# Patient Record
Sex: FEMALE | Race: WHITE | NOT HISPANIC OR LATINO | Employment: OTHER | URBAN - METROPOLITAN AREA
[De-identification: names, ages, dates, MRNs, and addresses within clinical notes are randomized per-mention and may not be internally consistent; named-entity substitution may affect disease eponyms.]

---

## 2022-01-03 ENCOUNTER — APPOINTMENT (OUTPATIENT)
Dept: RADIOLOGY | Facility: MEDICAL CENTER | Age: 57
End: 2022-01-03
Attending: NURSE PRACTITIONER
Payer: COMMERCIAL

## 2022-01-04 ENCOUNTER — TELEPHONE (OUTPATIENT)
Dept: SCHEDULING | Facility: IMAGING CENTER | Age: 57
End: 2022-01-04

## 2022-04-07 SDOH — ECONOMIC STABILITY: HOUSING INSECURITY
IN THE LAST 12 MONTHS, WAS THERE A TIME WHEN YOU DID NOT HAVE A STEADY PLACE TO SLEEP OR SLEPT IN A SHELTER (INCLUDING NOW)?: NO

## 2022-04-07 SDOH — HEALTH STABILITY: PHYSICAL HEALTH: ON AVERAGE, HOW MANY DAYS PER WEEK DO YOU ENGAGE IN MODERATE TO STRENUOUS EXERCISE (LIKE A BRISK WALK)?: 7 DAYS

## 2022-04-07 SDOH — ECONOMIC STABILITY: TRANSPORTATION INSECURITY
IN THE PAST 12 MONTHS, HAS THE LACK OF TRANSPORTATION KEPT YOU FROM MEDICAL APPOINTMENTS OR FROM GETTING MEDICATIONS?: NO

## 2022-04-07 SDOH — ECONOMIC STABILITY: INCOME INSECURITY: HOW HARD IS IT FOR YOU TO PAY FOR THE VERY BASICS LIKE FOOD, HOUSING, MEDICAL CARE, AND HEATING?: NOT HARD AT ALL

## 2022-04-07 SDOH — ECONOMIC STABILITY: FOOD INSECURITY: WITHIN THE PAST 12 MONTHS, YOU WORRIED THAT YOUR FOOD WOULD RUN OUT BEFORE YOU GOT MONEY TO BUY MORE.: NEVER TRUE

## 2022-04-07 SDOH — ECONOMIC STABILITY: FOOD INSECURITY: WITHIN THE PAST 12 MONTHS, THE FOOD YOU BOUGHT JUST DIDN'T LAST AND YOU DIDN'T HAVE MONEY TO GET MORE.: NEVER TRUE

## 2022-04-07 SDOH — ECONOMIC STABILITY: HOUSING INSECURITY: IN THE LAST 12 MONTHS, HOW MANY PLACES HAVE YOU LIVED?: 1

## 2022-04-07 SDOH — HEALTH STABILITY: PHYSICAL HEALTH: ON AVERAGE, HOW MANY MINUTES DO YOU ENGAGE IN EXERCISE AT THIS LEVEL?: 40 MIN

## 2022-04-07 SDOH — HEALTH STABILITY: MENTAL HEALTH
STRESS IS WHEN SOMEONE FEELS TENSE, NERVOUS, ANXIOUS, OR CAN'T SLEEP AT NIGHT BECAUSE THEIR MIND IS TROUBLED. HOW STRESSED ARE YOU?: ONLY A LITTLE

## 2022-04-07 SDOH — ECONOMIC STABILITY: TRANSPORTATION INSECURITY
IN THE PAST 12 MONTHS, HAS LACK OF RELIABLE TRANSPORTATION KEPT YOU FROM MEDICAL APPOINTMENTS, MEETINGS, WORK OR FROM GETTING THINGS NEEDED FOR DAILY LIVING?: NO

## 2022-04-07 SDOH — ECONOMIC STABILITY: INCOME INSECURITY: IN THE LAST 12 MONTHS, WAS THERE A TIME WHEN YOU WERE NOT ABLE TO PAY THE MORTGAGE OR RENT ON TIME?: NO

## 2022-04-07 SDOH — ECONOMIC STABILITY: TRANSPORTATION INSECURITY
IN THE PAST 12 MONTHS, HAS LACK OF TRANSPORTATION KEPT YOU FROM MEETINGS, WORK, OR FROM GETTING THINGS NEEDED FOR DAILY LIVING?: NO

## 2022-04-07 ASSESSMENT — SOCIAL DETERMINANTS OF HEALTH (SDOH)
DO YOU BELONG TO ANY CLUBS OR ORGANIZATIONS SUCH AS CHURCH GROUPS UNIONS, FRATERNAL OR ATHLETIC GROUPS, OR SCHOOL GROUPS?: YES
HOW OFTEN DO YOU ATTENT MEETINGS OF THE CLUB OR ORGANIZATION YOU BELONG TO?: MORE THAN 4 TIMES PER YEAR
HOW OFTEN DO YOU HAVE A DRINK CONTAINING ALCOHOL: 2-3 TIMES A WEEK
IN A TYPICAL WEEK, HOW MANY TIMES DO YOU TALK ON THE PHONE WITH FAMILY, FRIENDS, OR NEIGHBORS?: MORE THAN THREE TIMES A WEEK
HOW OFTEN DO YOU ATTEND CHURCH OR RELIGIOUS SERVICES?: MORE THAN 4 TIMES PER YEAR
DO YOU BELONG TO ANY CLUBS OR ORGANIZATIONS SUCH AS CHURCH GROUPS UNIONS, FRATERNAL OR ATHLETIC GROUPS, OR SCHOOL GROUPS?: YES
WITHIN THE PAST 12 MONTHS, YOU WORRIED THAT YOUR FOOD WOULD RUN OUT BEFORE YOU GOT THE MONEY TO BUY MORE: NEVER TRUE
HOW OFTEN DO YOU HAVE SIX OR MORE DRINKS ON ONE OCCASION: NEVER
HOW MANY DRINKS CONTAINING ALCOHOL DO YOU HAVE ON A TYPICAL DAY WHEN YOU ARE DRINKING: 1 OR 2
IN A TYPICAL WEEK, HOW MANY TIMES DO YOU TALK ON THE PHONE WITH FAMILY, FRIENDS, OR NEIGHBORS?: MORE THAN THREE TIMES A WEEK
HOW OFTEN DO YOU GET TOGETHER WITH FRIENDS OR RELATIVES?: MORE THAN THREE TIMES A WEEK
HOW OFTEN DO YOU ATTENT MEETINGS OF THE CLUB OR ORGANIZATION YOU BELONG TO?: MORE THAN 4 TIMES PER YEAR
HOW OFTEN DO YOU ATTEND CHURCH OR RELIGIOUS SERVICES?: MORE THAN 4 TIMES PER YEAR
HOW OFTEN DO YOU GET TOGETHER WITH FRIENDS OR RELATIVES?: MORE THAN THREE TIMES A WEEK
HOW HARD IS IT FOR YOU TO PAY FOR THE VERY BASICS LIKE FOOD, HOUSING, MEDICAL CARE, AND HEATING?: NOT HARD AT ALL

## 2022-04-07 ASSESSMENT — LIFESTYLE VARIABLES
HOW MANY STANDARD DRINKS CONTAINING ALCOHOL DO YOU HAVE ON A TYPICAL DAY: 1 OR 2
HOW OFTEN DO YOU HAVE A DRINK CONTAINING ALCOHOL: 2-3 TIMES A WEEK
HOW OFTEN DO YOU HAVE SIX OR MORE DRINKS ON ONE OCCASION: NEVER

## 2022-04-08 ENCOUNTER — OFFICE VISIT (OUTPATIENT)
Dept: MEDICAL GROUP | Facility: PHYSICIAN GROUP | Age: 57
End: 2022-04-08
Payer: COMMERCIAL

## 2022-04-08 VITALS
HEART RATE: 76 BPM | OXYGEN SATURATION: 93 % | BODY MASS INDEX: 28 KG/M2 | SYSTOLIC BLOOD PRESSURE: 102 MMHG | TEMPERATURE: 98.8 F | DIASTOLIC BLOOD PRESSURE: 72 MMHG | HEIGHT: 64 IN | WEIGHT: 164 LBS

## 2022-04-08 DIAGNOSIS — Z12.31 ENCOUNTER FOR SCREENING MAMMOGRAM FOR MALIGNANT NEOPLASM OF BREAST: ICD-10-CM

## 2022-04-08 DIAGNOSIS — Z82.49 FAMILY HISTORY OF HEART ATTACK: ICD-10-CM

## 2022-04-08 DIAGNOSIS — Z71.84 TRAVEL ADVICE ENCOUNTER: ICD-10-CM

## 2022-04-08 DIAGNOSIS — Z11.59 NEED FOR HEPATITIS C SCREENING TEST: ICD-10-CM

## 2022-04-08 DIAGNOSIS — Z00.00 WELLNESS EXAMINATION: ICD-10-CM

## 2022-04-08 DIAGNOSIS — Z86.19 HISTORY OF SHINGLES: ICD-10-CM

## 2022-04-08 DIAGNOSIS — Z87.19 HISTORY OF GASTROESOPHAGEAL REFLUX (GERD): ICD-10-CM

## 2022-04-08 PROCEDURE — 99204 OFFICE O/P NEW MOD 45 MIN: CPT

## 2022-04-08 RX ORDER — M-VIT,TX,IRON,MINS/CALC/FOLIC 27MG-0.4MG
1 TABLET ORAL DAILY
COMMUNITY
End: 2023-01-12

## 2022-04-08 ASSESSMENT — PATIENT HEALTH QUESTIONNAIRE - PHQ9: CLINICAL INTERPRETATION OF PHQ2 SCORE: 0

## 2022-04-08 NOTE — ASSESSMENT & PLAN NOTE
-Self-limited condition resolved  -Strongly recommend shingles vaccine (Shingrix) ASAP  -Notify me with any outbreak of shingles, see optometry immediately for involvement your eyes

## 2022-04-08 NOTE — ASSESSMENT & PLAN NOTE
-Chronic stable condition  -Continue to take Pepcid OTC as needed for symptoms  -Notify me if condition worsens  -Avoid trigger foods if possible  -Avoid eating at least 3 hours before bedtime or laying in a recumbent position  -Sleep at a 30 to 45 degree angle if necessary to prevent GERD symptoms while resting

## 2022-04-08 NOTE — PROGRESS NOTES
"Subjective:     CC:  Diagnoses of Wellness examination, Encounter for screening mammogram for malignant neoplasm of breast, Need for hepatitis C screening test, History of gastroesophageal reflux (GERD), History of shingles, and Family history of heart attack were pertinent to this visit.    HISTORY OF THE PRESENT ILLNESS: Patient is a 56 y.o. female. This pleasant patient is here today to establish care and discuss the following problems:    Problem   History of Gastroesophageal Reflux (Gerd)    -Chronic stable condition  -Has been worked up in the past with GI with upper endoscopy and barium swallow.  It was concluded this condition was muscular skeletal in nature, provoked by trigger foods  -Patient was placed on Pepcid OTC as needed, this medication works well for her and she only needs to take it if she eats certain foods such as [place].     History of Shingles    -Patient reports to have previously experienced a shingles outbreak across her jaw.  She denies involvement of her eyes or visual changes during outbreak.  She also experienced a similar outbreak last year after receiving the Covid and Tdap vaccines.  Patient has not been vaccinated for shingles.     Family History of Heart Attack    -Strong family history of hypertension, hyperlipidemia, and heart attack in first-degree relatives.  Patient reports that her brother just had a recent heart attack.  He was placed on hypertension and hyperlipidemia medication.  He is doing well.  Patient reports that she eats a healthy diet and walks 4 miles each day 7 days/week.         Health Maintenance: Completed    ROS:   Review of Systems   All other systems reviewed and are negative.        Objective:     Exam: /72 (BP Location: Left arm, Patient Position: Sitting, BP Cuff Size: Adult)   Pulse 76   Temp 37.1 °C (98.8 °F) (Temporal)   Ht 1.626 m (5' 4\")   Wt 74.4 kg (164 lb)   SpO2 93%  Body mass index is 28.15 kg/m².    Physical Exam  Constitutional:  "      Appearance: Normal appearance.   HENT:      Head: Normocephalic.      Nose: Nose normal.      Mouth/Throat:      Mouth: Mucous membranes are moist.      Pharynx: Oropharynx is clear.   Eyes:      Extraocular Movements: Extraocular movements intact.      Pupils: Pupils are equal, round, and reactive to light.   Cardiovascular:      Rate and Rhythm: Normal rate and regular rhythm.      Pulses: Normal pulses.      Heart sounds: Normal heart sounds.   Pulmonary:      Effort: Pulmonary effort is normal.      Breath sounds: Normal breath sounds.   Musculoskeletal:         General: Normal range of motion.      Cervical back: Normal range of motion and neck supple.   Skin:     General: Skin is warm and dry.      Capillary Refill: Capillary refill takes less than 2 seconds.   Neurological:      General: No focal deficit present.      Mental Status: She is alert and oriented to person, place, and time.   Psychiatric:         Mood and Affect: Mood normal.         Behavior: Behavior normal.     Labs: No current labs to review    Assessment & Plan: Medical Decision Making   56 y.o. female with the following -    Problem List Items Addressed This Visit     History of gastroesophageal reflux (GERD)     -Chronic stable condition  -Continue to take Pepcid OTC as needed for symptoms  -Notify me if condition worsens  -Avoid trigger foods if possible  -Avoid eating at least 3 hours before bedtime or laying in a recumbent position  -Sleep at a 30 to 45 degree angle if necessary to prevent GERD symptoms while resting         History of shingles     -Self-limited condition resolved  -Strongly recommend shingles vaccine (Shingrix) ASAP  -Notify me with any outbreak of shingles, see optometry immediately for involvement your eyes         Family history of heart attack     -We will order routine lab work  -Continue to get exercise at least 30 minutes most days of the week  -Encouraged a Mediterranean style diet           Relevant Orders     CBC WITHOUT DIFFERENTIAL    Comp Metabolic Panel    Lipid Profile    TSH WITH REFLEX TO FT4    VITAMIN D,25 HYDROXY    HEMOGLOBIN A1C      Other Visit Diagnoses     Wellness examination        Relevant Orders    CBC WITHOUT DIFFERENTIAL    Comp Metabolic Panel    Lipid Profile    TSH WITH REFLEX TO FT4    VITAMIN D,25 HYDROXY    HEMOGLOBIN A1C    Encounter for screening mammogram for malignant neoplasm of breast        Relevant Orders    MA-SCREENING MAMMO BILAT W/TOMOSYNTHESIS W/CAD    Need for hepatitis C screening test        Relevant Orders    HCV Scrn ( [id number] 1xLife)          Differential diagnosis, natural history, supportive care, and indications for immediate follow-up discussed.  Shared decision making utilized.  Patient congruent with plan of care.  Patient understands to return to clinic or go to the emergency department if symptoms worsen. All questions and concerns addressed.      Return in about 6 months (around 10/8/2022).    Please note that this dictation was created using voice recognition software. I have made every reasonable attempt to correct obvious errors, but I expect that there are errors of grammar and possibly content that I did not discover before finalizing the note.

## 2022-04-08 NOTE — ASSESSMENT & PLAN NOTE
-We will order routine lab work  -Continue to get exercise at least 30 minutes most days of the week  -Encouraged a Mediterranean style diet

## 2022-04-09 NOTE — PROGRESS NOTES
Patient requesting PCR SARS COVID-19 test for travel.  She will need test by 6-22-22.  Order placed

## 2022-04-13 ENCOUNTER — HOSPITAL ENCOUNTER (OUTPATIENT)
Dept: RADIOLOGY | Facility: MEDICAL CENTER | Age: 57
End: 2022-04-13
Payer: COMMERCIAL

## 2022-06-17 ENCOUNTER — HOSPITAL ENCOUNTER (OUTPATIENT)
Dept: RADIOLOGY | Facility: MEDICAL CENTER | Age: 57
End: 2022-06-17
Payer: COMMERCIAL

## 2022-06-17 DIAGNOSIS — Z12.31 ENCOUNTER FOR SCREENING MAMMOGRAM FOR MALIGNANT NEOPLASM OF BREAST: ICD-10-CM

## 2022-06-17 PROCEDURE — 77063 BREAST TOMOSYNTHESIS BI: CPT

## 2022-06-22 ENCOUNTER — HOSPITAL ENCOUNTER (OUTPATIENT)
Dept: LAB | Facility: MEDICAL CENTER | Age: 57
End: 2022-06-22
Payer: COMMERCIAL

## 2022-06-22 DIAGNOSIS — Z00.00 WELLNESS EXAMINATION: ICD-10-CM

## 2022-06-22 DIAGNOSIS — Z82.49 FAMILY HISTORY OF HEART ATTACK: ICD-10-CM

## 2022-06-22 DIAGNOSIS — Z11.59 NEED FOR HEPATITIS C SCREENING TEST: ICD-10-CM

## 2022-06-22 LAB
25(OH)D3 SERPL-MCNC: 44 NG/ML (ref 30–100)
ALBUMIN SERPL BCP-MCNC: 4.6 G/DL (ref 3.2–4.9)
ALBUMIN/GLOB SERPL: 1.8 G/DL
ALP SERPL-CCNC: 89 U/L (ref 30–99)
ALT SERPL-CCNC: 14 U/L (ref 2–50)
ANION GAP SERPL CALC-SCNC: 12 MMOL/L (ref 7–16)
AST SERPL-CCNC: 17 U/L (ref 12–45)
BILIRUB SERPL-MCNC: 0.8 MG/DL (ref 0.1–1.5)
BUN SERPL-MCNC: 15 MG/DL (ref 8–22)
CALCIUM SERPL-MCNC: 9.5 MG/DL (ref 8.5–10.5)
CHLORIDE SERPL-SCNC: 105 MMOL/L (ref 96–112)
CHOLEST SERPL-MCNC: 255 MG/DL (ref 100–199)
CO2 SERPL-SCNC: 23 MMOL/L (ref 20–33)
CREAT SERPL-MCNC: 0.75 MG/DL (ref 0.5–1.4)
ERYTHROCYTE [DISTWIDTH] IN BLOOD BY AUTOMATED COUNT: 47.6 FL (ref 35.9–50)
EST. AVERAGE GLUCOSE BLD GHB EST-MCNC: 97 MG/DL
FASTING STATUS PATIENT QL REPORTED: NORMAL
GFR SERPLBLD CREATININE-BSD FMLA CKD-EPI: 93 ML/MIN/1.73 M 2
GLOBULIN SER CALC-MCNC: 2.6 G/DL (ref 1.9–3.5)
GLUCOSE SERPL-MCNC: 88 MG/DL (ref 65–99)
HBA1C MFR BLD: 5 % (ref 4–5.6)
HCT VFR BLD AUTO: 43.8 % (ref 37–47)
HCV AB SER QL: NORMAL
HDLC SERPL-MCNC: 67 MG/DL
HGB BLD-MCNC: 14.1 G/DL (ref 12–16)
LDLC SERPL CALC-MCNC: 160 MG/DL
MCH RBC QN AUTO: 30.1 PG (ref 27–33)
MCHC RBC AUTO-ENTMCNC: 32.2 G/DL (ref 33.6–35)
MCV RBC AUTO: 93.6 FL (ref 81.4–97.8)
PLATELET # BLD AUTO: 264 K/UL (ref 164–446)
PMV BLD AUTO: 11.5 FL (ref 9–12.9)
POTASSIUM SERPL-SCNC: 4 MMOL/L (ref 3.6–5.5)
PROT SERPL-MCNC: 7.2 G/DL (ref 6–8.2)
RBC # BLD AUTO: 4.68 M/UL (ref 4.2–5.4)
SODIUM SERPL-SCNC: 140 MMOL/L (ref 135–145)
TRIGL SERPL-MCNC: 138 MG/DL (ref 0–149)
TSH SERPL DL<=0.005 MIU/L-ACNC: 2.26 UIU/ML (ref 0.38–5.33)
WBC # BLD AUTO: 6.7 K/UL (ref 4.8–10.8)

## 2022-06-22 PROCEDURE — G0472 HEP C SCREEN HIGH RISK/OTHER: HCPCS

## 2022-06-22 PROCEDURE — 84443 ASSAY THYROID STIM HORMONE: CPT

## 2022-06-22 PROCEDURE — 36415 COLL VENOUS BLD VENIPUNCTURE: CPT

## 2022-06-22 PROCEDURE — 85027 COMPLETE CBC AUTOMATED: CPT

## 2022-06-22 PROCEDURE — 80061 LIPID PANEL: CPT

## 2022-06-22 PROCEDURE — 83036 HEMOGLOBIN GLYCOSYLATED A1C: CPT

## 2022-06-22 PROCEDURE — 80053 COMPREHEN METABOLIC PANEL: CPT

## 2022-06-22 PROCEDURE — 82306 VITAMIN D 25 HYDROXY: CPT

## 2023-01-12 ENCOUNTER — TELEMEDICINE (OUTPATIENT)
Dept: MEDICAL GROUP | Facility: PHYSICIAN GROUP | Age: 58
End: 2023-01-12
Payer: COMMERCIAL

## 2023-01-12 VITALS — HEIGHT: 64 IN | BODY MASS INDEX: 25.61 KG/M2 | WEIGHT: 150 LBS

## 2023-01-12 DIAGNOSIS — E78.5 DYSLIPIDEMIA: ICD-10-CM

## 2023-01-12 DIAGNOSIS — Z83.511 FAMILY HISTORY OF GLAUCOMA: ICD-10-CM

## 2023-01-12 DIAGNOSIS — M25.512 ACUTE PAIN OF LEFT SHOULDER: ICD-10-CM

## 2023-01-12 PROCEDURE — 99214 OFFICE O/P EST MOD 30 MIN: CPT | Mod: 95

## 2023-01-12 ASSESSMENT — PATIENT HEALTH QUESTIONNAIRE - PHQ9: CLINICAL INTERPRETATION OF PHQ2 SCORE: 0

## 2023-01-12 ASSESSMENT — FIBROSIS 4 INDEX: FIB4 SCORE: 0.98

## 2023-01-12 NOTE — ASSESSMENT & PLAN NOTE
Undiagnosed condition of uncertain prognosis  - Recommend RICE modalities including use of heat if soothing.  Okay to take over-the-counter NSAIDs as directed  - X-ray ordered to rule out internal derangements and possible calcific tendinitis  - Referral to PT for strength and range of motion-in the event of frozen shoulder  - Referral to sports medicine for further evaluation and consultation regarding treatment methodologies

## 2023-01-12 NOTE — PROGRESS NOTES
Virtual Visit: Established Patient   This visit was conducted via Zoom using secure and encrypted videoconferencing technology.   The patient was in their home in the [facility].    The patient's identity was confirmed and verbal consent was obtained for this virtual visit.    Subjective:   CC:   Chief Complaint   Patient presents with    Pain     Left shoulder     Aileen Dsouza is a 57 y.o. female presenting for evaluation and management of:    Problem   Family History of Glaucoma    Needs referral to optomotry for annual screening for glaucoma     Acute Pain of Left Shoulder    This is a new problem.  In December patient was holding her granddaughter and her arms and felt a pop in her left shoulder.  She initially was just experiencing pain at night for which she was taking Advil but now she has developed excruciating pain in the daytime as well.  She is unable to lift her shoulder above level of her head-she cannot brush or wash her hair with her left arm.  She reports great difficulty with daily activities.  This has been further exacerbated recently due to shoveling and walking her dog.  -This was a virtual visit it is difficult to assess her shoulder but when asked to go through range of motion she was unable to move her shoulder much beyond the frontal plane or over her head.     Dyslipidemia    Patient with elevated cholesterol as of June 2022.  She does not take medication for this condition.  She reports to eat a healthy diet and exercises regularly.           ROS   See HPI    Current medicines (including changes today)  No current outpatient medications on file.     No current facility-administered medications for this visit.       Patient Active Problem List    Diagnosis Date Noted    Family history of glaucoma 01/12/2023    Acute pain of left shoulder 01/12/2023    Dyslipidemia 01/12/2023    History of gastroesophageal reflux (GERD) 04/08/2022    History of shingles 04/08/2022    Family  "history of heart attack 04/08/2022        Objective:   Ht 1.626 m (5' 4\")   Wt 68 kg (150 lb)   BMI 25.75 kg/m²     Physical Exam:  Constitutional: Alert, no distress, well-groomed.  Skin: No rashes in visible areas.  Eye: Round. Conjunctiva clear, lids normal. No icterus.   ENMT: Lips pink without lesions, good dentition, moist mucous membranes. Phonation normal.  Neck: No masses, no thyromegaly. Moves freely without pain.  Respiratory: Unlabored respiratory effort, no cough or audible wheeze  Psych: Alert and oriented x3, normal affect and mood.   Musk: decreased left shoulder ROM    Assessment and Plan:   The following treatment plan was discussed:   Problem List Items Addressed This Visit       Family history of glaucoma    Relevant Orders    Referral to Optometry    Acute pain of left shoulder     Undiagnosed condition of uncertain prognosis  - Recommend RICE modalities including use of heat if soothing.  Okay to take over-the-counter NSAIDs as directed  - X-ray ordered to rule out internal derangements and possible calcific tendinitis  - Referral to PT for strength and range of motion-in the event of frozen shoulder  - Referral to sports medicine for further evaluation and consultation regarding treatment methodologies         Relevant Orders    DX-SHOULDER 2+ LEFT    Referral to Physical Therapy    Referral to Sports Medicine    Dyslipidemia     Chronic condition uncontrolled  - Recommend diligent efforts towards healthy diet and exercise  - Lipid panel and CMP ordered to be done in June 2023 follow-up appointment recommended after         Relevant Orders    Comp Metabolic Panel    Lipid Profile       1. Acute pain of left shoulder  - DX-SHOULDER 2+ LEFT; Future  - Referral to Physical Therapy  - Referral to Sports Medicine    2. Family history of glaucoma  - Referral to Optometry    3. Dyslipidemia  - Comp Metabolic Panel; Future  - Lipid Profile; Future      Follow-up: Return in about 6 months (around " 7/12/2023) for F/U Lab Review.

## 2023-01-12 NOTE — ASSESSMENT & PLAN NOTE
Chronic condition uncontrolled  - Recommend diligent efforts towards healthy diet and exercise  - Lipid panel and CMP ordered to be done in June 2023 follow-up appointment recommended after

## 2023-05-25 ENCOUNTER — HOSPITAL ENCOUNTER (OUTPATIENT)
Dept: LAB | Facility: MEDICAL CENTER | Age: 58
End: 2023-05-25
Payer: COMMERCIAL

## 2023-05-25 DIAGNOSIS — E78.5 DYSLIPIDEMIA: ICD-10-CM

## 2023-05-25 LAB
ALBUMIN SERPL BCP-MCNC: 4.3 G/DL (ref 3.2–4.9)
ALBUMIN/GLOB SERPL: 1.7 G/DL
ALP SERPL-CCNC: 89 U/L (ref 30–99)
ALT SERPL-CCNC: 18 U/L (ref 2–50)
ANION GAP SERPL CALC-SCNC: 10 MMOL/L (ref 7–16)
AST SERPL-CCNC: 20 U/L (ref 12–45)
BILIRUB SERPL-MCNC: 0.5 MG/DL (ref 0.1–1.5)
BUN SERPL-MCNC: 16 MG/DL (ref 8–22)
CALCIUM ALBUM COR SERPL-MCNC: 9.4 MG/DL (ref 8.5–10.5)
CALCIUM SERPL-MCNC: 9.6 MG/DL (ref 8.5–10.5)
CHLORIDE SERPL-SCNC: 108 MMOL/L (ref 96–112)
CHOLEST SERPL-MCNC: 230 MG/DL (ref 100–199)
CO2 SERPL-SCNC: 23 MMOL/L (ref 20–33)
CREAT SERPL-MCNC: 0.76 MG/DL (ref 0.5–1.4)
FASTING STATUS PATIENT QL REPORTED: NORMAL
GFR SERPLBLD CREATININE-BSD FMLA CKD-EPI: 91 ML/MIN/1.73 M 2
GLOBULIN SER CALC-MCNC: 2.6 G/DL (ref 1.9–3.5)
GLUCOSE SERPL-MCNC: 82 MG/DL (ref 65–99)
HDLC SERPL-MCNC: 54 MG/DL
LDLC SERPL CALC-MCNC: 153 MG/DL
POTASSIUM SERPL-SCNC: 4.4 MMOL/L (ref 3.6–5.5)
PROT SERPL-MCNC: 6.9 G/DL (ref 6–8.2)
SODIUM SERPL-SCNC: 141 MMOL/L (ref 135–145)
TRIGL SERPL-MCNC: 117 MG/DL (ref 0–149)

## 2023-05-25 PROCEDURE — 80061 LIPID PANEL: CPT

## 2023-05-25 PROCEDURE — 36415 COLL VENOUS BLD VENIPUNCTURE: CPT

## 2023-05-25 PROCEDURE — 80053 COMPREHEN METABOLIC PANEL: CPT

## 2023-06-02 ENCOUNTER — HOSPITAL ENCOUNTER (OUTPATIENT)
Dept: RADIOLOGY | Facility: MEDICAL CENTER | Age: 58
End: 2023-06-02
Payer: COMMERCIAL

## 2023-06-02 ENCOUNTER — TELEMEDICINE (OUTPATIENT)
Dept: MEDICAL GROUP | Facility: PHYSICIAN GROUP | Age: 58
End: 2023-06-02
Payer: COMMERCIAL

## 2023-06-02 DIAGNOSIS — E78.5 DYSLIPIDEMIA: ICD-10-CM

## 2023-06-02 PROCEDURE — 4410556 CT-CARDIAC SCORING (SELF PAY ONLY)

## 2023-06-02 PROCEDURE — 99214 OFFICE O/P EST MOD 30 MIN: CPT | Mod: 95

## 2023-06-02 NOTE — PROGRESS NOTES
Virtual Visit: Established Patient   This visit was conducted via Zoom using secure and encrypted videoconferencing technology.   The patient was in their home in the [facility].    The patient's identity was confirmed and verbal consent was obtained for this virtual visit.    Subjective:   CC:   Chief Complaint   Patient presents with    Results     Cholestrol     Aileen Dsouza is a 58 y.o. female presenting for evaluation and management of:    Problem   Dyslipidemia    Patient with elevated cholesterol as of June 2022.  She does not take medication for this condition.  She reports to eat a healthy diet and exercises regularly.  -Family history of CAD, brother had MI at 52. All sibling on statins, mom with hyperlipidemia  Lab Results   Component Value Date/Time    CHOLSTRLTOT 230 (H) 05/25/2023 07:44 AM    CHOLSTRLTOT 255 (H) 06/22/2022 09:03 AM     (H) 05/25/2023 07:44 AM     (H) 06/22/2022 09:03 AM    HDL 54 05/25/2023 07:44 AM    HDL 67 06/22/2022 09:03 AM    TRIGLYCERIDE 117 05/25/2023 07:44 AM    TRIGLYCERIDE 138 06/22/2022 09:03 AM                  ROS   Negative ROS    Current medicines (including changes today)  No current outpatient medications on file.     No current facility-administered medications for this visit.       Patient Active Problem List    Diagnosis Date Noted    Family history of glaucoma 01/12/2023    Acute pain of left shoulder 01/12/2023    Dyslipidemia 01/12/2023    History of gastroesophageal reflux (GERD) 04/08/2022    History of shingles 04/08/2022    Family history of heart attack 04/08/2022        Objective:   There were no vitals taken for this visit.    Physical Exam:  Constitutional: Alert, no distress, well-groomed.  Skin: No rashes in visible areas.  Eye: Round. Conjunctiva clear, lids normal. No icterus.   ENMT: Lips pink without lesions, good dentition, moist mucous membranes. Phonation normal.  Neck: No masses, no thyromegaly. Moves freely without  pain.  Respiratory: Unlabored respiratory effort, no cough or audible wheeze  Psych: Alert and oriented x3, normal affect and mood.     Assessment and Plan:   The following treatment plan was discussed:   Problem List Items Addressed This Visit       Dyslipidemia     Chronic condition uncontrolled  - Recommend diligent efforts towards healthy diet and exercise   --Exercise: At least 150 minutes of moderate aerobic activity per week or 75 minutes of vigorous aerobic activity per week, +2 days/week of strength training  - Healthy lifestyle and eating habits: Mediterranean-based diet (rich in fruits, vegetables, nuts and healthy oils), proper hydration and avoiding sugary beverages, adequate sleep hygiene-(allowing 7 to 8 hours of overnight sleep).    - Lipid panel and CMP ordered to be done Dec 2023 follow-up appointment recommended after  -Cardiac CT ordered           Relevant Orders    CT-CARDIAC SCORING    Comp Metabolic Panel    Lipid Profile    Lipoprotein (a)         Follow-up: Return in about 6 months (around 12/2/2023).

## 2023-06-02 NOTE — ASSESSMENT & PLAN NOTE
Chronic condition uncontrolled  - Recommend diligent efforts towards healthy diet and exercise   --Exercise: At least 150 minutes of moderate aerobic activity per week or 75 minutes of vigorous aerobic activity per week, +2 days/week of strength training  - Healthy lifestyle and eating habits: Mediterranean-based diet (rich in fruits, vegetables, nuts and healthy oils), proper hydration and avoiding sugary beverages, adequate sleep hygiene-(allowing 7 to 8 hours of overnight sleep).    - Lipid panel and CMP ordered to be done Dec 2023 follow-up appointment recommended after  -Cardiac CT ordered

## 2023-06-05 DIAGNOSIS — E78.5 DYSLIPIDEMIA: ICD-10-CM

## 2023-06-05 RX ORDER — ROSUVASTATIN CALCIUM 5 MG/1
5 TABLET, COATED ORAL EVERY EVENING
Qty: 30 TABLET | Refills: 11 | Status: SHIPPED | OUTPATIENT
Start: 2023-06-05

## 2023-06-19 ENCOUNTER — HOSPITAL ENCOUNTER (OUTPATIENT)
Dept: RADIOLOGY | Facility: MEDICAL CENTER | Age: 58
End: 2023-06-19
Payer: COMMERCIAL

## 2023-06-19 DIAGNOSIS — Z12.31 VISIT FOR SCREENING MAMMOGRAM: ICD-10-CM

## 2023-06-19 PROCEDURE — 77063 BREAST TOMOSYNTHESIS BI: CPT

## 2023-10-24 DIAGNOSIS — Z13.6 SCREENING FOR CARDIOVASCULAR CONDITION: ICD-10-CM

## 2023-10-24 DIAGNOSIS — Z00.00 HEALTHCARE MAINTENANCE: ICD-10-CM

## 2023-10-24 DIAGNOSIS — E78.5 DYSLIPIDEMIA: ICD-10-CM

## 2023-11-22 ENCOUNTER — HOSPITAL ENCOUNTER (OUTPATIENT)
Dept: LAB | Facility: MEDICAL CENTER | Age: 58
End: 2023-11-22
Payer: COMMERCIAL

## 2023-11-22 DIAGNOSIS — Z00.00 HEALTHCARE MAINTENANCE: ICD-10-CM

## 2023-11-22 DIAGNOSIS — Z13.6 SCREENING FOR CARDIOVASCULAR CONDITION: ICD-10-CM

## 2023-11-22 DIAGNOSIS — E78.5 DYSLIPIDEMIA: ICD-10-CM

## 2023-11-22 LAB
ALBUMIN SERPL BCP-MCNC: 4.5 G/DL (ref 3.2–4.9)
ALBUMIN/GLOB SERPL: 1.6 G/DL
ALP SERPL-CCNC: 86 U/L (ref 30–99)
ALT SERPL-CCNC: 18 U/L (ref 2–50)
ANION GAP SERPL CALC-SCNC: 13 MMOL/L (ref 7–16)
AST SERPL-CCNC: 21 U/L (ref 12–45)
BILIRUB SERPL-MCNC: 1 MG/DL (ref 0.1–1.5)
BUN SERPL-MCNC: 18 MG/DL (ref 8–22)
CALCIUM ALBUM COR SERPL-MCNC: 9.2 MG/DL (ref 8.5–10.5)
CALCIUM SERPL-MCNC: 9.6 MG/DL (ref 8.5–10.5)
CHLORIDE SERPL-SCNC: 106 MMOL/L (ref 96–112)
CHOLEST SERPL-MCNC: 196 MG/DL (ref 100–199)
CO2 SERPL-SCNC: 20 MMOL/L (ref 20–33)
CREAT SERPL-MCNC: 0.72 MG/DL (ref 0.5–1.4)
FASTING STATUS PATIENT QL REPORTED: NORMAL
GFR SERPLBLD CREATININE-BSD FMLA CKD-EPI: 97 ML/MIN/1.73 M 2
GLOBULIN SER CALC-MCNC: 2.9 G/DL (ref 1.9–3.5)
GLUCOSE SERPL-MCNC: 79 MG/DL (ref 65–99)
HDLC SERPL-MCNC: 64 MG/DL
LDLC SERPL CALC-MCNC: 111 MG/DL
POTASSIUM SERPL-SCNC: 4.1 MMOL/L (ref 3.6–5.5)
PROT SERPL-MCNC: 7.4 G/DL (ref 6–8.2)
SODIUM SERPL-SCNC: 139 MMOL/L (ref 135–145)
TRIGL SERPL-MCNC: 103 MG/DL (ref 0–149)

## 2023-11-22 PROCEDURE — 80053 COMPREHEN METABOLIC PANEL: CPT

## 2023-11-22 PROCEDURE — 83695 ASSAY OF LIPOPROTEIN(A): CPT

## 2023-11-22 PROCEDURE — 36415 COLL VENOUS BLD VENIPUNCTURE: CPT

## 2023-11-22 PROCEDURE — 80061 LIPID PANEL: CPT

## 2023-11-25 LAB — LPA SERPL-MCNC: 143 MG/DL

## 2024-03-19 DIAGNOSIS — H91.90 HEARING LOSS, UNSPECIFIED HEARING LOSS TYPE, UNSPECIFIED LATERALITY: ICD-10-CM

## 2024-03-19 DIAGNOSIS — Z83.511 FAMILY HISTORY OF GLAUCOMA: ICD-10-CM

## 2024-03-19 DIAGNOSIS — L98.9 SKIN LESION OF BACK: ICD-10-CM

## 2024-04-17 ENCOUNTER — APPOINTMENT (RX ONLY)
Dept: URBAN - METROPOLITAN AREA CLINIC 6 | Facility: CLINIC | Age: 59
Setting detail: DERMATOLOGY
End: 2024-04-17

## 2024-04-17 DIAGNOSIS — L82.1 OTHER SEBORRHEIC KERATOSIS: ICD-10-CM

## 2024-04-17 DIAGNOSIS — L71.8 OTHER ROSACEA: ICD-10-CM

## 2024-04-17 PROCEDURE — ? COUNSELING

## 2024-04-17 PROCEDURE — 99203 OFFICE O/P NEW LOW 30 MIN: CPT

## 2024-04-17 PROCEDURE — ? ADDITIONAL NOTES

## 2024-04-17 ASSESSMENT — LOCATION SIMPLE DESCRIPTION DERM
LOCATION SIMPLE: LEFT FOREHEAD
LOCATION SIMPLE: LEFT UPPER BACK

## 2024-04-17 ASSESSMENT — LOCATION DETAILED DESCRIPTION DERM
LOCATION DETAILED: LEFT MID-UPPER BACK
LOCATION DETAILED: LEFT INFERIOR MEDIAL FOREHEAD

## 2024-04-17 ASSESSMENT — LOCATION ZONE DERM
LOCATION ZONE: TRUNK
LOCATION ZONE: FACE

## 2024-04-17 ASSESSMENT — SEVERITY ASSESSMENT OVERALL AMONG ALL PATIENTS: IN YOUR EXPERIENCE, AMONG ALL PATIENTS YOU HAVE SEEN WITH THIS CONDITION, HOW SEVERE IS THIS PATIENT'S CONDITION?: MILD

## 2024-04-17 NOTE — PROCEDURE: ADDITIONAL NOTES
Additional Notes: Recommended cosmetic treatment with Vbeam laser, patient given cosmetic handout and contact information.
Detail Level: Detailed
Render Risk Assessment In Note?: no

## 2024-05-03 SDOH — ECONOMIC STABILITY: INCOME INSECURITY: IN THE LAST 12 MONTHS, WAS THERE A TIME WHEN YOU WERE NOT ABLE TO PAY THE MORTGAGE OR RENT ON TIME?: NO

## 2024-05-03 SDOH — ECONOMIC STABILITY: HOUSING INSECURITY: IN THE LAST 12 MONTHS, HOW MANY PLACES HAVE YOU LIVED?: 1

## 2024-05-03 SDOH — ECONOMIC STABILITY: FOOD INSECURITY: WITHIN THE PAST 12 MONTHS, THE FOOD YOU BOUGHT JUST DIDN'T LAST AND YOU DIDN'T HAVE MONEY TO GET MORE.: NEVER TRUE

## 2024-05-03 SDOH — HEALTH STABILITY: PHYSICAL HEALTH: ON AVERAGE, HOW MANY MINUTES DO YOU ENGAGE IN EXERCISE AT THIS LEVEL?: 60 MIN

## 2024-05-03 SDOH — ECONOMIC STABILITY: FOOD INSECURITY: WITHIN THE PAST 12 MONTHS, YOU WORRIED THAT YOUR FOOD WOULD RUN OUT BEFORE YOU GOT MONEY TO BUY MORE.: NEVER TRUE

## 2024-05-03 SDOH — ECONOMIC STABILITY: INCOME INSECURITY: HOW HARD IS IT FOR YOU TO PAY FOR THE VERY BASICS LIKE FOOD, HOUSING, MEDICAL CARE, AND HEATING?: NOT HARD AT ALL

## 2024-05-03 SDOH — HEALTH STABILITY: PHYSICAL HEALTH: ON AVERAGE, HOW MANY DAYS PER WEEK DO YOU ENGAGE IN MODERATE TO STRENUOUS EXERCISE (LIKE A BRISK WALK)?: 7 DAYS

## 2024-05-03 ASSESSMENT — SOCIAL DETERMINANTS OF HEALTH (SDOH)
HOW OFTEN DO YOU ATTEND CHURCH OR RELIGIOUS SERVICES?: MORE THAN 4 TIMES PER YEAR
WITHIN THE PAST 12 MONTHS, YOU WORRIED THAT YOUR FOOD WOULD RUN OUT BEFORE YOU GOT THE MONEY TO BUY MORE: NEVER TRUE
IN A TYPICAL WEEK, HOW MANY TIMES DO YOU TALK ON THE PHONE WITH FAMILY, FRIENDS, OR NEIGHBORS?: TWICE A WEEK
HOW OFTEN DO YOU HAVE A DRINK CONTAINING ALCOHOL: 2-3 TIMES A WEEK
HOW OFTEN DO YOU ATTENT MEETINGS OF THE CLUB OR ORGANIZATION YOU BELONG TO?: MORE THAN 4 TIMES PER YEAR
HOW OFTEN DO YOU GET TOGETHER WITH FRIENDS OR RELATIVES?: TWICE A WEEK
HOW OFTEN DO YOU GET TOGETHER WITH FRIENDS OR RELATIVES?: TWICE A WEEK
IN A TYPICAL WEEK, HOW MANY TIMES DO YOU TALK ON THE PHONE WITH FAMILY, FRIENDS, OR NEIGHBORS?: TWICE A WEEK
DO YOU BELONG TO ANY CLUBS OR ORGANIZATIONS SUCH AS CHURCH GROUPS UNIONS, FRATERNAL OR ATHLETIC GROUPS, OR SCHOOL GROUPS?: YES
HOW OFTEN DO YOU HAVE SIX OR MORE DRINKS ON ONE OCCASION: NEVER
HOW OFTEN DO YOU ATTENT MEETINGS OF THE CLUB OR ORGANIZATION YOU BELONG TO?: MORE THAN 4 TIMES PER YEAR
HOW OFTEN DO YOU ATTEND CHURCH OR RELIGIOUS SERVICES?: MORE THAN 4 TIMES PER YEAR
HOW HARD IS IT FOR YOU TO PAY FOR THE VERY BASICS LIKE FOOD, HOUSING, MEDICAL CARE, AND HEATING?: NOT HARD AT ALL
DO YOU BELONG TO ANY CLUBS OR ORGANIZATIONS SUCH AS CHURCH GROUPS UNIONS, FRATERNAL OR ATHLETIC GROUPS, OR SCHOOL GROUPS?: YES
HOW MANY DRINKS CONTAINING ALCOHOL DO YOU HAVE ON A TYPICAL DAY WHEN YOU ARE DRINKING: 1 OR 2

## 2024-05-03 ASSESSMENT — LIFESTYLE VARIABLES
HOW OFTEN DO YOU HAVE SIX OR MORE DRINKS ON ONE OCCASION: NEVER
HOW MANY STANDARD DRINKS CONTAINING ALCOHOL DO YOU HAVE ON A TYPICAL DAY: 1 OR 2
HOW OFTEN DO YOU HAVE A DRINK CONTAINING ALCOHOL: 2-3 TIMES A WEEK
SKIP TO QUESTIONS 9-10: 1
AUDIT-C TOTAL SCORE: 3

## 2024-05-06 ENCOUNTER — APPOINTMENT (OUTPATIENT)
Dept: MEDICAL GROUP | Facility: PHYSICIAN GROUP | Age: 59
End: 2024-05-06
Payer: COMMERCIAL

## 2024-05-06 VITALS
HEART RATE: 64 BPM | DIASTOLIC BLOOD PRESSURE: 70 MMHG | OXYGEN SATURATION: 98 % | SYSTOLIC BLOOD PRESSURE: 120 MMHG | BODY MASS INDEX: 27.31 KG/M2 | WEIGHT: 160 LBS | HEIGHT: 64 IN | TEMPERATURE: 99.1 F

## 2024-05-06 DIAGNOSIS — Z00.00 WELLNESS EXAMINATION: Primary | ICD-10-CM

## 2024-05-06 DIAGNOSIS — E78.5 DYSLIPIDEMIA: ICD-10-CM

## 2024-05-06 DIAGNOSIS — Z12.31 ENCOUNTER FOR SCREENING MAMMOGRAM FOR MALIGNANT NEOPLASM OF BREAST: ICD-10-CM

## 2024-05-06 PROCEDURE — 3078F DIAST BP <80 MM HG: CPT

## 2024-05-06 PROCEDURE — 99396 PREV VISIT EST AGE 40-64: CPT

## 2024-05-06 PROCEDURE — 3074F SYST BP LT 130 MM HG: CPT

## 2024-05-06 ASSESSMENT — FIBROSIS 4 INDEX: FIB4 SCORE: 1.09

## 2024-05-06 ASSESSMENT — PATIENT HEALTH QUESTIONNAIRE - PHQ9: CLINICAL INTERPRETATION OF PHQ2 SCORE: 0

## 2024-05-06 NOTE — PROGRESS NOTES
Subjective:     CC:   Chief Complaint   Patient presents with    Dyslipidemia    Follow-Up       HPI:   Aileen Dsouza is a 58 y.o. female who presents for annual exam    Patient has GYN provider: No   Last Pap Smear:    H/O Abnormal Pap: No  Last Mammogram:   Last Bone Density Test: N/A  Last Colorectal Cancer Screening: Age 50, UTD  Last Tdap: UTD  Received HPV series: Aged out    Exercise: strenuous regular exercise, aerobic > 3 hours a week, walks 5 miles a day  Diet: Healthy      No LMP recorded.  She has not utilized hormone replacement therapy.  Reports hot flashes  No significant bloating/fluid retention, pelvic pain, or dyspareunia. No abnormal vaginal discharge.   No breast tenderness, mass, nipple discharge or changes in size or contour.    OB History    Para Term  AB Living   3 2 0 0 1 0   SAB IAB Ectopic Molar Multiple Live Births   0 0 0 0 0 0      She  reports that she is not currently sexually active.    She  has a past medical history of GERD (gastroesophageal reflux disease).  She  has a past surgical history that includes lumpectomy.    Family History   Problem Relation Age of Onset    Hypertension Mother     Hypertension Father     Alzheimer's Disease Father     No Known Problems Brother     Thyroid Daughter     Hypertension Brother     Hyperlipidemia Brother     Hypertension Brother     Hyperlipidemia Brother     Heart Disease Brother     Heart Attack Brother     No Known Problems Daughter      Social History     Tobacco Use    Smoking status: Never    Smokeless tobacco: Never   Vaping Use    Vaping Use: Never used   Substance Use Topics    Alcohol use: Yes     Alcohol/week: 1.2 oz     Types: 2 Glasses of wine per week    Drug use: Never       Patient Active Problem List    Diagnosis Date Noted    Family history of glaucoma 2023    Acute pain of left shoulder 2023    Dyslipidemia 2023    History of gastroesophageal reflux (GERD) 2022     "History of shingles 04/08/2022    Family history of heart attack 04/08/2022     Current Outpatient Medications   Medication Sig Dispense Refill    rosuvastatin (CRESTOR) 5 MG Tab Take 1 Tablet by mouth every evening. 30 Tablet 11     No current facility-administered medications for this visit.     Allergies   Allergen Reactions    Ampicillin Hives    Peanut (Diagnostic) Diarrhea    Penicillins Hives and Rash     hives       Review of Systems   Constitutional: Negative for fever, chills and malaise/fatigue.   HENT: Negative for congestion.    Eyes: Negative for pain.   Respiratory: Negative for cough and shortness of breath.    Cardiovascular: Negative for chest pain and leg swelling.   Gastrointestinal: Negative for nausea, vomiting, abdominal pain and diarrhea.   Genitourinary: Negative for dysuria and hematuria.   Skin: Negative for rash.   Neurological: Negative for dizziness, focal weakness and headaches.   Endo/Heme/Allergies: Does not bruise/bleed easily.   Psychiatric/Behavioral: Negative for depression.  The patient is not nervous/anxious.      Objective:   /70 (BP Location: Left arm, Patient Position: Sitting, BP Cuff Size: Adult)   Pulse 64   Temp 37.3 °C (99.1 °F) (Temporal)   Ht 1.626 m (5' 4\")   Wt 72.6 kg (160 lb)   SpO2 98%   BMI 27.46 kg/m²     Wt Readings from Last 4 Encounters:   05/06/24 72.6 kg (160 lb)   01/12/23 68 kg (150 lb)   01/12/23 68 kg (150 lb)   04/08/22 74.4 kg (164 lb)     Physical Exam:  Constitutional: Well-developed and well-nourished. Not diaphoretic. No distress.   Skin: Skin is warm and dry. No rash noted.  Head: Atraumatic without lesions.  Eyes: Conjunctivae and extraocular motions are normal. Pupils are equal, round, and reactive to light. No scleral icterus.   Ears:  External ears unremarkable. Tympanic membranes clear and intact.  Nose: Nares patent. Septum midline. Turbinates without erythema nor edema. No discharge.   Mouth/Throat: Tongue normal. Oropharynx " is clear and moist. Posterior pharynx without erythema or exudates.  Neck: Supple, trachea midline. Normal range of motion. No thyromegaly present. No lymphadenopathy--cervical or supraclavicular.  Cardiovascular: Regular rate and rhythm, S1 and S2 without murmur, rubs, or gallops.    Respiratory: Effort normal. Clear to auscultation throughout. No adventitious sounds.   Abdomen: Soft, non tender, and without distention. Active bowel sounds in all four quadrants. No rebound, guarding, masses or HSM.  Extremities: No cyanosis, clubbing, erythema, nor edema. Distal pulses intact and symmetric.   Musculoskeletal: All major joints AROM full in all directions without pain.  Neurological: Alert and oriented x 3. Grossly non-focal. Strength and sensation grossly intact. DTRs 2+/3 and symmetric.   Psychiatric:  Behavior, mood, and affect are appropriate.    Assessment and Plan:     1. Wellness examination    2. Encounter for screening mammogram for malignant neoplasm of breast  - MA-SCREENING MAMMO BILAT W/TOMOSYNTHESIS W/CAD; Future    3. Dyslipidemia      Health maintenance:  UTD   Labs per orders  Immunizations per orders  Patient counseled about skin care, diet, supplements, and exercise.  Discussed  breast self exam, mammography screening, menopause, osteoporosis, adequate intake of calcium and vitamin D, diet and exercise, colorectal cancer screening     Follow-up: Return in about 1 year (around 5/6/2025).

## 2024-05-14 DIAGNOSIS — E78.5 DYSLIPIDEMIA: ICD-10-CM

## 2024-05-14 RX ORDER — ROSUVASTATIN CALCIUM 5 MG/1
5 TABLET, COATED ORAL EVERY EVENING
Qty: 30 TABLET | Refills: 11 | Status: SHIPPED | OUTPATIENT
Start: 2024-05-14

## 2024-05-14 NOTE — TELEPHONE ENCOUNTER
Received request via: Pharmacy    Was the patient seen in the last year in this department? Yes    Does the patient have an active prescription (recently filled or refills available) for medication(s) requested? No    Pharmacy Name: [facility]      Does the patient have snf Plus and need 100 day supply (blood pressure, diabetes and cholesterol meds only)? Patient does not have SCP

## 2024-05-28 ENCOUNTER — HOSPITAL ENCOUNTER (OUTPATIENT)
Dept: LAB | Facility: MEDICAL CENTER | Age: 59
End: 2024-05-28
Payer: COMMERCIAL

## 2024-05-28 DIAGNOSIS — Z00.00 WELLNESS EXAMINATION: ICD-10-CM

## 2024-05-28 DIAGNOSIS — E78.5 DYSLIPIDEMIA: ICD-10-CM

## 2024-05-28 LAB
ALBUMIN SERPL BCP-MCNC: 4.3 G/DL (ref 3.2–4.9)
ALBUMIN/GLOB SERPL: 1.5 G/DL
ALP SERPL-CCNC: 87 U/L (ref 30–99)
ALT SERPL-CCNC: 21 U/L (ref 2–50)
ANION GAP SERPL CALC-SCNC: 13 MMOL/L (ref 7–16)
AST SERPL-CCNC: 19 U/L (ref 12–45)
BILIRUB SERPL-MCNC: 0.8 MG/DL (ref 0.1–1.5)
BUN SERPL-MCNC: 20 MG/DL (ref 8–22)
CALCIUM ALBUM COR SERPL-MCNC: 9.5 MG/DL (ref 8.5–10.5)
CALCIUM SERPL-MCNC: 9.7 MG/DL (ref 8.5–10.5)
CHLORIDE SERPL-SCNC: 107 MMOL/L (ref 96–112)
CHOLEST SERPL-MCNC: 185 MG/DL (ref 100–199)
CO2 SERPL-SCNC: 19 MMOL/L (ref 20–33)
CREAT SERPL-MCNC: 0.73 MG/DL (ref 0.5–1.4)
FASTING STATUS PATIENT QL REPORTED: NORMAL
GFR SERPLBLD CREATININE-BSD FMLA CKD-EPI: 95 ML/MIN/1.73 M 2
GLOBULIN SER CALC-MCNC: 2.8 G/DL (ref 1.9–3.5)
GLUCOSE SERPL-MCNC: 87 MG/DL (ref 65–99)
HDLC SERPL-MCNC: 71 MG/DL
LDLC SERPL CALC-MCNC: 96 MG/DL
POTASSIUM SERPL-SCNC: 4.6 MMOL/L (ref 3.6–5.5)
PROT SERPL-MCNC: 7.1 G/DL (ref 6–8.2)
SODIUM SERPL-SCNC: 139 MMOL/L (ref 135–145)
TRIGL SERPL-MCNC: 90 MG/DL (ref 0–149)

## 2024-06-19 ENCOUNTER — HOSPITAL ENCOUNTER (OUTPATIENT)
Dept: RADIOLOGY | Facility: MEDICAL CENTER | Age: 59
End: 2024-06-19
Payer: COMMERCIAL

## 2024-06-19 DIAGNOSIS — Z12.31 ENCOUNTER FOR SCREENING MAMMOGRAM FOR MALIGNANT NEOPLASM OF BREAST: ICD-10-CM

## 2024-06-19 PROCEDURE — 77063 BREAST TOMOSYNTHESIS BI: CPT

## 2024-06-20 DIAGNOSIS — N63.21 MASS OF UPPER OUTER QUADRANT OF LEFT BREAST: ICD-10-CM

## 2024-06-26 ENCOUNTER — HOSPITAL ENCOUNTER (OUTPATIENT)
Dept: RADIOLOGY | Facility: MEDICAL CENTER | Age: 59
End: 2024-06-26
Payer: COMMERCIAL

## 2024-06-26 DIAGNOSIS — N63.21 MASS OF UPPER OUTER QUADRANT OF LEFT BREAST: ICD-10-CM

## 2024-06-26 DIAGNOSIS — R92.8 ABNORMAL MAMMOGRAM: ICD-10-CM

## 2024-06-26 PROCEDURE — 76642 ULTRASOUND BREAST LIMITED: CPT | Mod: LT

## 2024-09-11 DIAGNOSIS — E78.5 DYSLIPIDEMIA: ICD-10-CM

## 2024-09-11 RX ORDER — ROSUVASTATIN CALCIUM 10 MG/1
10 TABLET, COATED ORAL EVERY EVENING
Qty: 90 TABLET | Refills: 3 | Status: SHIPPED | OUTPATIENT
Start: 2024-09-11

## 2025-01-21 DIAGNOSIS — R06.83 SNORING: ICD-10-CM

## 2025-03-12 ENCOUNTER — OFFICE VISIT (OUTPATIENT)
Dept: MEDICAL GROUP | Facility: PHYSICIAN GROUP | Age: 60
End: 2025-03-12
Payer: COMMERCIAL

## 2025-03-12 VITALS
HEIGHT: 64 IN | OXYGEN SATURATION: 96 % | TEMPERATURE: 97.3 F | SYSTOLIC BLOOD PRESSURE: 104 MMHG | HEART RATE: 74 BPM | BODY MASS INDEX: 25.4 KG/M2 | DIASTOLIC BLOOD PRESSURE: 70 MMHG | WEIGHT: 148.8 LBS

## 2025-03-12 DIAGNOSIS — E78.41 ELEVATED LIPOPROTEIN(A): ICD-10-CM

## 2025-03-12 DIAGNOSIS — Z23 NEED FOR VACCINATION: ICD-10-CM

## 2025-03-12 DIAGNOSIS — Z12.11 SCREENING FOR COLON CANCER: ICD-10-CM

## 2025-03-12 DIAGNOSIS — Z82.49 FAMILY HISTORY OF HEART ATTACK: ICD-10-CM

## 2025-03-12 DIAGNOSIS — H33.312 RETINAL TEAR OF LEFT EYE: ICD-10-CM

## 2025-03-12 DIAGNOSIS — R93.1 ABNORMAL SCREENING CARDIAC CT: ICD-10-CM

## 2025-03-12 DIAGNOSIS — Z12.31 ENCOUNTER FOR SCREENING MAMMOGRAM FOR MALIGNANT NEOPLASM OF BREAST: ICD-10-CM

## 2025-03-12 DIAGNOSIS — Z87.19 HISTORY OF GASTROESOPHAGEAL REFLUX (GERD): ICD-10-CM

## 2025-03-12 DIAGNOSIS — R07.89 OTHER CHEST PAIN: ICD-10-CM

## 2025-03-12 DIAGNOSIS — E78.5 DYSLIPIDEMIA: ICD-10-CM

## 2025-03-12 PROCEDURE — 3074F SYST BP LT 130 MM HG: CPT

## 2025-03-12 PROCEDURE — 90471 IMMUNIZATION ADMIN: CPT

## 2025-03-12 PROCEDURE — 90677 PCV20 VACCINE IM: CPT

## 2025-03-12 PROCEDURE — 90472 IMMUNIZATION ADMIN EACH ADD: CPT

## 2025-03-12 PROCEDURE — 90746 HEPB VACCINE 3 DOSE ADULT IM: CPT | Mod: JZ

## 2025-03-12 PROCEDURE — 3078F DIAST BP <80 MM HG: CPT

## 2025-03-12 PROCEDURE — 99214 OFFICE O/P EST MOD 30 MIN: CPT | Mod: 25

## 2025-03-12 RX ORDER — PREDNISOLONE ACETATE 10 MG/ML
SUSPENSION/ DROPS OPHTHALMIC
COMMUNITY
Start: 2025-01-08

## 2025-03-12 ASSESSMENT — ENCOUNTER SYMPTOMS
DIZZINESS: 0
DIARRHEA: 0
VOMITING: 0
COUGH: 0
MYALGIAS: 0
CONSTIPATION: 0
HEADACHES: 0
CHILLS: 0
BLURRED VISION: 0
WEAKNESS: 0
NAUSEA: 0
SHORTNESS OF BREATH: 0
WEIGHT LOSS: 0
FEVER: 0
ABDOMINAL PAIN: 0

## 2025-03-12 ASSESSMENT — PATIENT HEALTH QUESTIONNAIRE - PHQ9: CLINICAL INTERPRETATION OF PHQ2 SCORE: 0

## 2025-03-12 NOTE — PROGRESS NOTES
Verbal consent was acquired by the patient to use [employer] ambient listening note generation during this visit  Subjective:     CC:  Diagnoses of Family history of heart attack, Other chest pain, Dyslipidemia, Encounter for screening mammogram for malignant neoplasm of breast, Screening for colon cancer, Need for vaccination, Elevated lipoprotein(a), Abnormal screening cardiac CT, Retinal tear of left eye, and History of gastroesophageal reflux (GERD) were pertinent to this visit.    HISTORY OF THE PRESENT ILLNESS: Patient is a 59 y.o. female.   Problem   Elevated Lipoprotein(a)   Other Chest Pain    Patient reports episode of chest pain that occurred over the weekend.  She reported feeling a tightness sensation in her chest.  She denied any nausea or vomiting, no left arm or jaw pain.  No history of similar occurrence however she does have a strong family history of coronary artery disease with her brother having an MI in his 50s.  She does have a known elevation to LP(a) as well as coronary artery calcification shown in her LAD.  She was placed on rosuvastatin 10 mg daily which she is tolerating well.  EKG ordered.     Abnormal Screening Cardiac CT   Retinal Tear of Left Eye   Dyslipidemia    Patient with elevated cholesterol as of June 2022.  She is on Rosuvastatin 10 mg daily this condition.  She reports to eat a healthy diet and exercises regularly.  -Family history of CAD, brother had MI at 52. All sibling on statins, mom with hyperlipidemia  -Positive LP(a) and she does show a positive CAC score of 26 to her LAD       History of Gastroesophageal Reflux (Gerd)    -Chronic stable condition  -Has been worked up in the past with GI with upper endoscopy and barium swallow.  It was concluded this condition was muscular skeletal in nature, provoked by trigger foods  -Patient was placed on Pepcid OTC as needed, this medication works well for her and she only needs to take it if she eats certain foods such as  "Lao.         History of Present Illness        ROS:   Review of Systems   Constitutional:  Negative for chills, fever, malaise/fatigue and weight loss.   Eyes:  Negative for blurred vision.   Respiratory:  Negative for cough and shortness of breath.    Cardiovascular:  Positive for chest pain.   Gastrointestinal:  Negative for abdominal pain, constipation, diarrhea, nausea and vomiting.   Musculoskeletal:  Negative for myalgias.   Neurological:  Negative for dizziness, weakness and headaches.         Objective:     Exam: /70 (BP Location: Left arm, Patient Position: Sitting, BP Cuff Size: Adult)   Pulse 74   Temp 36.3 °C (97.3 °F) (Temporal)   Ht 1.626 m (5' 4\")   Wt 67.5 kg (148 lb 12.8 oz)   SpO2 96%  Body mass index is 25.54 kg/m².    Physical Exam  Constitutional:       General: She is not in acute distress.     Appearance: Normal appearance. She is not ill-appearing or toxic-appearing.   HENT:      Head: Normocephalic.   Eyes:      Conjunctiva/sclera: Conjunctivae normal.   Cardiovascular:      Rate and Rhythm: Normal rate and regular rhythm.      Pulses: Normal pulses.      Heart sounds: Normal heart sounds. No murmur heard.  Pulmonary:      Effort: Pulmonary effort is normal. No respiratory distress.      Breath sounds: Normal breath sounds.   Skin:     General: Skin is warm and dry.   Neurological:      General: No focal deficit present.      Mental Status: She is alert and oriented to person, place, and time.   Psychiatric:         Mood and Affect: Mood normal.         Behavior: Behavior normal.           Labs:   No visits with results within 1 Month(s) from this visit.   Latest known visit with results is:   Hospital Outpatient Visit on 05/28/2024   Component Date Value    Cholesterol,Tot 05/28/2024 185     Triglycerides 05/28/2024 90     HDL 05/28/2024 71     LDL 05/28/2024 96     Sodium 05/28/2024 139     Potassium 05/28/2024 4.6     Chloride 05/28/2024 107     Co2 05/28/2024 19 (L)     " Anion Gap 05/28/2024 13.0     Glucose 05/28/2024 87     Bun 05/28/2024 20     Creatinine 05/28/2024 0.73     Calcium 05/28/2024 9.7     Correct Calcium 05/28/2024 9.5     AST(SGOT) 05/28/2024 19     ALT(SGPT) 05/28/2024 21     Alkaline Phosphatase 05/28/2024 87     Total Bilirubin 05/28/2024 0.8     Albumin 05/28/2024 4.3     Total Protein 05/28/2024 7.1     Globulin 05/28/2024 2.8     A-G Ratio 05/28/2024 1.5     Fasting Status 05/28/2024 Fasting     GFR (CKD-EPI) 05/28/2024 95          Assessment & Plan: Medical Decision Making   59 y.o. female with the following -    Assessment & Plan        Problem List Items Addressed This Visit       History of gastroesophageal reflux (GERD)    Chronic condition not currently taking any medication on a regular basis however at the other day did have some chest tightness which she is unsure is related to this condition or possible cardiac in nature.  She has not had this recur in the last few days.  Recommend continuation of omeprazole 20 mg daily and/or famotidine 20 mg nightly should GERD flareup.         Family history of heart attack    Relevant Orders    EKG - Clinic Performed    REFERRAL TO CARDIOLOGY    Dyslipidemia    Chronic condition, well-controlled today on a statin she will continue rosuvastatin 10 mg daily    Given her history of brother with MI, positive CT coronary artery calcium buildup in LAD of 26, positive LP(a), she would like referral to cardiology for further evaluation         Relevant Orders    REFERRAL TO CARDIOLOGY    Comp Metabolic Panel    Lipid Profile    Elevated lipoprotein(a)    Likely chronic in nature she is currently on 10 mg rosuvastatin with calcification in LAD.  We did discuss optimizing and increasing rosuvastatin however she would like referral to cardiology to further evaluate and discuss.         Relevant Orders    REFERRAL TO CARDIOLOGY    Lipid Profile    Other chest pain    The 10-year ASCVD risk score (Yeni SAEED, et al., 2019) is:  1.5%    Values used to calculate the score:      Age: 59 years      Sex: Female      Is Non- : No      Diabetic: No      Tobacco smoker: No      Systolic Blood Pressure: 104 mmHg      Is BP treated: No      HDL Cholesterol: 71 mg/dL      Total Cholesterol: 185 mg/dL    EKG performed in clinic shows normal sinus rhythm without evidence of ST elevation or acute changes there is no ectopy noted.  She is not actively having chest pain at this time.  ED precautions given and known for reoccurrence of this condition.  Referral placed to cardiology for further evaluation given profound cardiac history of heart attack in her sibling in his 50s as well as known cardiac calcification in her LAD and elevated LP(a).         Relevant Orders    EKG - Clinic Performed    REFERRAL TO CARDIOLOGY    Comp Metabolic Panel    Abnormal screening cardiac CT    Relevant Orders    REFERRAL TO CARDIOLOGY    Retinal tear of left eye    Patient with history of retinal tear to left eye for which she was seeing ophthalmology.  She is still experiencing pain and would like referral for second opinion.         Relevant Orders    Referral to Ophthalmology     Other Visit Diagnoses         Encounter for screening mammogram for malignant neoplasm of breast        Relevant Orders    MA-SCREENING MAMMO BILAT W/TOMOSYNTHESIS W/CAD      Screening for colon cancer        Relevant Orders    Cologuard® colon cancer screening      Need for vaccination        Relevant Orders    Pneumococcal Conjugate Vaccine 20-Valent (6 wks+) (Completed)    Hepatitis B Vaccine Adult IM (Completed)          EKG Interpretation   Ordered and interpreted by NATI Hamlin  Rhythm: 69 normal sinus   Rate: normal   Axis: normal P: 35. QRS 78, T 54  Ectopy: none   Conduction: normal   ST Segments: no acute change   T Waves: no acute change   Q Waves: none   Clinical Impression: no acute changes and normal EKG    Differential diagnosis, natural history,  supportive care, and indications for immediate follow-up discussed.  Shared decision making approach was utilized, and patient is amendable with plan of care.  Patient understands to return to clinic or go to the emergency department if symptoms worsen. All questions and concerns addressed to the best of my knowledge.      Return in about 6 months (around 9/12/2025), or if symptoms worsen or fail to improve, for F/U Lab Review.    Please note that this dictation was created using voice recognition software. I have made every reasonable attempt to correct obvious errors, but I expect that there are errors of grammar and possibly content that I did not discover before finalizing the note.

## 2025-03-13 NOTE — Clinical Note
REFERRAL APPROVAL NOTICE         Sent on March 13, 2025                   Aileen Dsouza  [address] [facility]  [place] [address]                   Dear Ms. Dsouza,    After a careful review of the medical information and benefit coverage, [facility] has processed your referral. See below for additional details.    If applicable, you must be actively enrolled with your insurance for coverage of the authorized service. If you have any questions regarding your coverage, please contact your insurance directly.    REFERRAL INFORMATION   Referral #:  [id number]  Referred-To Provider    Referred-By Provider:  Ophthalmology    Gabino Cortez D.N.P.    [facility]      [address]   [address]  [place] [address]  [phone] [address]  [place] [address]  [phone]    Referral Start Date:  03/12/2025  Referral End Date:   03/12/2026             SCHEDULING  If you do not already have an appointment, please call [phone] to make an appointment.     MORE INFORMATION  If you do not already have a [employer] account, sign up at: [email or web address]  You can access your medical information, make appointments, see lab results, billing information, and more.  If you have questions regarding this referral, please contact  the [facility] Referrals department at:             [phone]. Monday - Friday 8:00AM - 5:00PM.     Sincerely,    [facility]

## 2025-03-21 ENCOUNTER — TELEPHONE (OUTPATIENT)
Dept: HEALTH INFORMATION MANAGEMENT | Facility: OTHER | Age: 60
End: 2025-03-21
Payer: COMMERCIAL

## 2025-03-24 LAB — NONINV COLON CA DNA+OCC BLD SCRN STL QL: NEGATIVE

## 2025-03-25 ENCOUNTER — RESULTS FOLLOW-UP (OUTPATIENT)
Dept: MEDICAL GROUP | Facility: PHYSICIAN GROUP | Age: 60
End: 2025-03-25

## 2025-04-02 ENCOUNTER — APPOINTMENT (OUTPATIENT)
Dept: CARDIOLOGY | Facility: MEDICAL CENTER | Age: 60
End: 2025-04-02
Payer: COMMERCIAL

## 2025-04-03 ENCOUNTER — TELEPHONE (OUTPATIENT)
Dept: CARDIOLOGY | Facility: MEDICAL CENTER | Age: 60
End: 2025-04-03
Payer: COMMERCIAL

## 2025-04-03 NOTE — TELEPHONE ENCOUNTER
Spoke to patient in regards to records for NP appointment with [facility].     Patient has seen a cardiologist before?  No    Any recent cardiac testing outside of [facility]?  No    Pt had an EKG done and it is under media.

## 2025-04-21 ENCOUNTER — TELEPHONE (OUTPATIENT)
Dept: MEDICAL GROUP | Facility: PHYSICIAN GROUP | Age: 60
End: 2025-04-21
Payer: COMMERCIAL

## 2025-04-21 DIAGNOSIS — H33.312 RETINAL TEAR OF LEFT EYE: ICD-10-CM

## 2025-04-21 NOTE — TELEPHONE ENCOUNTER
The patient is here to ask for another referral to  [phone]. The Eye center needs to send her to another specialist. Please call the patient with any questions

## 2025-04-24 ENCOUNTER — OFFICE VISIT (OUTPATIENT)
Dept: MEDICAL GROUP | Facility: PHYSICIAN GROUP | Age: 60
End: 2025-04-24
Payer: COMMERCIAL

## 2025-04-24 VITALS
OXYGEN SATURATION: 97 % | TEMPERATURE: 97.6 F | HEIGHT: 64 IN | WEIGHT: 141.4 LBS | BODY MASS INDEX: 24.14 KG/M2 | HEART RATE: 91 BPM | DIASTOLIC BLOOD PRESSURE: 70 MMHG | SYSTOLIC BLOOD PRESSURE: 112 MMHG

## 2025-04-24 DIAGNOSIS — H33.319 RETINAL TEAR, UNSPECIFIED LATERALITY: ICD-10-CM

## 2025-04-24 DIAGNOSIS — H18.522: ICD-10-CM

## 2025-04-24 PROCEDURE — 99213 OFFICE O/P EST LOW 20 MIN: CPT | Performed by: FAMILY MEDICINE

## 2025-04-24 PROCEDURE — 3074F SYST BP LT 130 MM HG: CPT | Performed by: FAMILY MEDICINE

## 2025-04-24 PROCEDURE — 3078F DIAST BP <80 MM HG: CPT | Performed by: FAMILY MEDICINE

## 2025-04-24 NOTE — PROGRESS NOTES
"Verbal consent was acquired by the patient to use [employer] ambient listening note generation during this visit.    Subjective:     HPI:   History of Present Illness  The patient presents with left ocular pain.    The patient reports significant pain in the left eye, which has been diagnosed as a corneal disease. She has consulted two ophthalmologists and underwent ocular surgery in January 2025 to address a retinal tear and detachment. Despite reassurances from the [facility] regarding the stability of her condition, she reports an inability to lift more than 5 pounds without experiencing a popping sensation in the eye accompanied by severe pain. Consequently, she has been referred to Dr. Bao Castro, a cornea specialist, by the [facility]. However, she has been informed that a referral from her primary care physician is necessary to proceed with this consultation.    Additionally, the patient had an appointment scheduled with a cardiologist for the following day, but it was communicated that the physician only conducts televisits. Recent episodes of chest pain have been attributed to poor dietary habits.    PAST SURGICAL HISTORY:  Ocular surgery for retinal tear and detachment in January 2025.    Health Maintenance: Completed    Objective:     Exam:  /70 (BP Location: Right arm, Patient Position: Sitting, BP Cuff Size: Adult)   Pulse 91   Temp 36.4 °C (97.6 °F) (Temporal)   Ht 1.626 m (5' 4\")   Wt 64.1 kg (141 lb 6.4 oz)   SpO2 97%   BMI 24.27 kg/m²  Body mass index is 24.27 kg/m².    Physical Exam  Vitals reviewed.   Constitutional:       Appearance: Normal appearance.   HENT:      Head: Normocephalic and atraumatic.      Right Ear: External ear normal.      Left Ear: External ear normal.      Nose: Nose normal.   Cardiovascular:      Rate and Rhythm: Normal rate and regular rhythm.      Pulses: Normal pulses.      Heart sounds: Normal heart sounds. No murmur heard.  Pulmonary:      " Effort: Pulmonary effort is normal. No respiratory distress.      Breath sounds: Normal breath sounds. No wheezing.   Abdominal:      General: Abdomen is flat.      Palpations: Abdomen is soft.   Skin:     General: Skin is warm and dry.   Neurological:      Mental Status: She is alert and oriented to person, place, and time.   Psychiatric:         Mood and Affect: Mood normal.         Behavior: Behavior normal.             Results      Assessment & Plan:     1. 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 Left eye pain.  - Reports significant pain in the left eye, previously evaluated by two ophthalmologists and diagnosed with MAP fingerprint dystrophy.  - Physical exam findings indicate no acute distress; however, the patient experiences pain when lifting >5 pounds.  - Discussed the need for a referral to Dr. Bao Castro, a cornea specialist, for further evaluation and management.  - Referral to Dr. Bao Castro will be initiated to address the cornea issue and ongoing pain.    2. Chest pain.  - Experienced chest pain, which is attributed to poor dietary habits during the week it occurred.  - Heart sounds and heart rate are normal today, indicating no immediate cardiac concerns.  - Reviewed the patient's recent dietary habits and their potential impact on chest pain.  - No additional treatment required at this time; advised to monitor dietary habits and report any recurrent symptoms.    3. Medication management.  - Currently prescribed rosuvastatin, which is inexpensive and should be covered by insurance.  - Vital signs are stable, and the medication regimen appears effective.  - Discussed the continuation of the current medication regimen and potential insurance coverage.  - Advised to maintain the current medication regimen and check with the pharmacy benefit of [employer] for coverage confirmation.    4.  Establishing care.  - Patient expressed interest in establishing care with the clinician due to ongoing health issues.  - Vital signs are stable, and no immediate concerns were noted during the physical exam.  - Discussed the process of establishing care and potential out-of-pocket costs.  - Recommended scheduling an appointment to officially establish care and ensure continuity of treatment and referrals.          Return if symptoms worsen or fail to improve, for N2U.    Please note that this dictation was created using voice recognition software. I have made every reasonable attempt to correct obvious errors, but I expect that there are errors of grammar and possibly content that I did not discover before finalizing the note.    Batsheva Lopes MD  Family Medicine and Non - Operative Sports Medicine   [facility] [address]

## 2025-04-25 NOTE — Clinical Note
REFERRAL APPROVAL NOTICE         Sent on April 25, 2025                   Aileen Dsouza  [address]  [place] [address]                   Dear Ms. Dsouza,    After a careful review of the medical information and benefit coverage, [facility] has processed your referral. See below for additional details.    If applicable, you must be actively enrolled with your insurance for coverage of the authorized service. If you have any questions regarding your coverage, please contact your insurance directly.    REFERRAL INFORMATION   Referral #:  [id number]  Referred-To Provider    Referred-By Provider:  Ophthalmology    ORLANDO Phan, TIAGO      [address]  [place] [address] [phone] [address]  [place] [id number] [phone]    Referral Start Date:  04/21/2025  Referral End Date:   04/21/2026             SCHEDULING  If you do not already have an appointment, please call [phone] to make an appointment.     MORE INFORMATION  If you do not already have a [employer] account, sign up at: [email or web address]  You can access your medical information, make appointments, see lab results, billing information, and more.  If you have questions regarding this referral, please contact  the [facility] Referrals department at:             [phone]. Monday - Friday 8:00AM - 5:00PM.     Sincerely,    [facility]

## 2025-04-28 ENCOUNTER — HOSPITAL ENCOUNTER (OUTPATIENT)
Dept: LAB | Facility: MEDICAL CENTER | Age: 60
End: 2025-04-28
Payer: COMMERCIAL

## 2025-04-28 DIAGNOSIS — E78.5 DYSLIPIDEMIA: ICD-10-CM

## 2025-04-28 DIAGNOSIS — E78.41 ELEVATED LIPOPROTEIN(A): ICD-10-CM

## 2025-04-28 DIAGNOSIS — R07.89 OTHER CHEST PAIN: ICD-10-CM

## 2025-04-28 LAB
ALBUMIN SERPL BCP-MCNC: 4.1 G/DL (ref 3.2–4.9)
ALBUMIN/GLOB SERPL: 1.5 G/DL
ALP SERPL-CCNC: 64 U/L (ref 30–99)
ALT SERPL-CCNC: 22 U/L (ref 2–50)
ANION GAP SERPL CALC-SCNC: 11 MMOL/L (ref 7–16)
AST SERPL-CCNC: 21 U/L (ref 12–45)
BILIRUB SERPL-MCNC: 0.6 MG/DL (ref 0.1–1.5)
BUN SERPL-MCNC: 5 MG/DL (ref 8–22)
CALCIUM ALBUM COR SERPL-MCNC: 9.5 MG/DL (ref 8.5–10.5)
CALCIUM SERPL-MCNC: 9.6 MG/DL (ref 8.5–10.5)
CHLORIDE SERPL-SCNC: 103 MMOL/L (ref 96–112)
CHOLEST SERPL-MCNC: 146 MG/DL (ref 100–199)
CO2 SERPL-SCNC: 23 MMOL/L (ref 20–33)
CREAT SERPL-MCNC: 0.74 MG/DL (ref 0.5–1.4)
FASTING STATUS PATIENT QL REPORTED: NORMAL
GFR SERPLBLD CREATININE-BSD FMLA CKD-EPI: 93 ML/MIN/1.73 M 2
GLOBULIN SER CALC-MCNC: 2.7 G/DL (ref 1.9–3.5)
GLUCOSE SERPL-MCNC: 91 MG/DL (ref 65–99)
HDLC SERPL-MCNC: 62 MG/DL
LDLC SERPL CALC-MCNC: 70 MG/DL
POTASSIUM SERPL-SCNC: 4.4 MMOL/L (ref 3.6–5.5)
PROT SERPL-MCNC: 6.8 G/DL (ref 6–8.2)
SODIUM SERPL-SCNC: 137 MMOL/L (ref 135–145)
TRIGL SERPL-MCNC: 68 MG/DL (ref 0–149)

## 2025-04-28 PROCEDURE — 80053 COMPREHEN METABOLIC PANEL: CPT

## 2025-04-28 PROCEDURE — 80061 LIPID PANEL: CPT

## 2025-04-28 PROCEDURE — 36415 COLL VENOUS BLD VENIPUNCTURE: CPT

## 2025-04-29 ENCOUNTER — RESULTS FOLLOW-UP (OUTPATIENT)
Dept: MEDICAL GROUP | Facility: PHYSICIAN GROUP | Age: 60
End: 2025-04-29

## 2025-06-02 ENCOUNTER — TELEPHONE (OUTPATIENT)
Dept: MEDICAL GROUP | Facility: PHYSICIAN GROUP | Age: 60
End: 2025-06-02

## 2025-06-02 ENCOUNTER — OFFICE VISIT (OUTPATIENT)
Dept: CARDIOLOGY | Facility: MEDICAL CENTER | Age: 60
End: 2025-06-02
Payer: COMMERCIAL

## 2025-06-02 VITALS
HEART RATE: 81 BPM | OXYGEN SATURATION: 96 % | DIASTOLIC BLOOD PRESSURE: 68 MMHG | HEIGHT: 64 IN | RESPIRATION RATE: 16 BRPM | BODY MASS INDEX: 24.07 KG/M2 | SYSTOLIC BLOOD PRESSURE: 108 MMHG | WEIGHT: 141 LBS

## 2025-06-02 DIAGNOSIS — E78.5 DYSLIPIDEMIA: ICD-10-CM

## 2025-06-02 DIAGNOSIS — E78.41 ELEVATED LIPOPROTEIN A LEVEL: ICD-10-CM

## 2025-06-02 DIAGNOSIS — R93.1 ABNORMAL SCREENING CARDIAC CT: ICD-10-CM

## 2025-06-02 DIAGNOSIS — R93.1 ELEVATED CORONARY ARTERY CALCIUM SCORE: ICD-10-CM

## 2025-06-02 DIAGNOSIS — R07.89 OTHER CHEST PAIN: Primary | ICD-10-CM

## 2025-06-02 DIAGNOSIS — E78.00 PURE HYPERCHOLESTEROLEMIA: ICD-10-CM

## 2025-06-02 DIAGNOSIS — E78.41 ELEVATED LIPOPROTEIN(A): ICD-10-CM

## 2025-06-02 DIAGNOSIS — Z82.49 FAMILY HISTORY OF HEART ATTACK: ICD-10-CM

## 2025-06-02 DIAGNOSIS — Z82.49 FAMILY HISTORY OF PREMATURE CAD: ICD-10-CM

## 2025-06-02 LAB — EKG IMPRESSION: NORMAL

## 2025-06-02 PROCEDURE — 99204 OFFICE O/P NEW MOD 45 MIN: CPT | Performed by: INTERNAL MEDICINE

## 2025-06-02 PROCEDURE — 99214 OFFICE O/P EST MOD 30 MIN: CPT | Performed by: INTERNAL MEDICINE

## 2025-06-02 PROCEDURE — 93010 ELECTROCARDIOGRAM REPORT: CPT | Performed by: INTERNAL MEDICINE

## 2025-06-02 PROCEDURE — 3078F DIAST BP <80 MM HG: CPT | Performed by: INTERNAL MEDICINE

## 2025-06-02 PROCEDURE — 93005 ELECTROCARDIOGRAM TRACING: CPT | Mod: TC | Performed by: INTERNAL MEDICINE

## 2025-06-02 PROCEDURE — 99213 OFFICE O/P EST LOW 20 MIN: CPT | Performed by: INTERNAL MEDICINE

## 2025-06-02 PROCEDURE — 3074F SYST BP LT 130 MM HG: CPT | Performed by: INTERNAL MEDICINE

## 2025-06-02 RX ORDER — ROSUVASTATIN CALCIUM 20 MG/1
20 TABLET, COATED ORAL EVERY EVENING
Qty: 100 TABLET | Refills: 4 | Status: SHIPPED | OUTPATIENT
Start: 2025-06-02

## 2025-06-02 RX ORDER — EZETIMIBE 10 MG/1
10 TABLET ORAL DAILY
Qty: 100 TABLET | Refills: 4 | Status: SHIPPED | OUTPATIENT
Start: 2025-06-02

## 2025-06-02 ASSESSMENT — ENCOUNTER SYMPTOMS
PALPITATIONS: 0
VOMITING: 0
ABDOMINAL PAIN: 0
EYE DISCHARGE: 0
EYE PAIN: 0
BLURRED VISION: 0
ORTHOPNEA: 0
HALLUCINATIONS: 0
SENSORY CHANGE: 0
NAUSEA: 0
SPEECH CHANGE: 0
SHORTNESS OF BREATH: 0
DEPRESSION: 0
WEIGHT LOSS: 0
BLOOD IN STOOL: 0
HEADACHES: 0
LOSS OF CONSCIOUSNESS: 0
PND: 0
CLAUDICATION: 0
BRUISES/BLEEDS EASILY: 0
FEVER: 0
MYALGIAS: 0
COUGH: 0
DOUBLE VISION: 0
DIZZINESS: 0
CHILLS: 0
FALLS: 0

## 2025-06-02 NOTE — TELEPHONE ENCOUNTER
Caller Name: [phone]   Call Back Number: [phone]     How would the patient prefer to be contacted with a response: Phone call do NOT leave a detailed message    Aileen called said if a copy of her referral to cardiology from [place] can be faxed out to [phone].

## 2025-06-02 NOTE — PROGRESS NOTES
Chief Complaint   Patient presents with    New Patient     Dx: Abnormal screening cardiac CT    Dyslipidemia    Other     NP Dx: Family history of heart attack       Subjective     Aileen Dsouza is a 60 y.o. female who presents today for cardiac care and evaluation for chest pain. Patient has chest pain at sporadic times. No specific precipitating factors or worsening factors. Chest pain is described to be pressure-like sensation however localized at anterior chest without radition. Lasting for seconds to minutes. No prior cardiac problems. No prior cardiac workup.    + LAD calcium score of 27.    I have personally interpreted EKG today with patient, there is no evidence of acute coronary syndrome, no evidence of prior infarct, normal ME and QT interval, no significant conduction disease. Sinus rhythm.    Family hx of heart attack in brother.    I have independently interpreted and reviewed blood tests results with patient in clinic today which showed LDL level of 70, triglycerides level of 68, GFR of 93, K of 4.4, Hgba1c of 5, Lipoprotein a of 143.      Past Medical History[1]  Past Surgical History[2]  Family History   Problem Relation Age of Onset    Hypertension Mother     Hypertension Father     Alzheimer's Disease Father     No Known Problems Brother     Thyroid Daughter     Hypertension Brother     Hyperlipidemia Brother     Hypertension Brother     Hyperlipidemia Brother     Heart Disease Brother     Heart Attack Brother     No Known Problems Daughter      Social History     Socioeconomic History    Marital status:      Spouse name: Not on file    Number of children: Not on file    Years of education: Not on file    Highest education level: Bachelor's degree (e.g., BA, AB, BS)   Occupational History    Not on file   Tobacco Use    Smoking status: Never    Smokeless tobacco: Never   Vaping Use    Vaping status: Never Used   Substance and Sexual Activity    Alcohol use: Yes     Alcohol/week: 1.2  oz     Types: 2 Glasses of wine per week    Drug use: Never    Sexual activity: Not Currently   Other Topics Concern    Not on file   Social History Narrative    Not on file     Social Drivers of Health     Financial Resource Strain: Low Risk  (5/3/2024)    Overall Financial Resource Strain (CARDIA)     Difficulty of Paying Living Expenses: Not hard at all   Food Insecurity: No Food Insecurity (5/3/2024)    Hunger Vital Sign     Worried About Running Out of Food in the Last Year: Never true     Ran Out of Food in the Last Year: Never true   Transportation Needs: No Transportation Needs (5/3/2024)    PRAPARE - Transportation     Lack of Transportation (Medical): No     Lack of Transportation (Non-Medical): No   Physical Activity: Sufficiently Active (5/3/2024)    Exercise Vital Sign     Days of Exercise per Week: 7 days     Minutes of Exercise per Session: 60 min   Stress: No Stress Concern Present (5/3/2024)    [employer] - Occupational Stress Questionnaire     Feeling of Stress : Only a little   Social Connections: Socially Integrated (5/3/2024)    Social Connection and Isolation Panel [NHANES]     Frequency of Communication with Friends and Family: Twice a week     Frequency of Social Gatherings with Friends and Family: Twice a week     Attends Christianity Services: More than 4 times per year     Active Member of Clubs or Organizations: Yes     Attends Club or Organization Meetings: More than 4 times per year     Marital Status:    Intimate Partner Violence: Not on file   Housing Stability: Low Risk  (5/3/2024)    Housing Stability Vital Sign     Unable to Pay for Housing in the Last Year: No     Number of Places Lived in the Last Year: 1     Unstable Housing in the Last Year: No     Allergies[3]  Encounter Medications[4]  Review of Systems   Constitutional:  Negative for chills, fever, malaise/fatigue and weight loss.   HENT:  Negative for ear discharge, ear pain, hearing loss  "and nosebleeds.    Eyes:  Negative for blurred vision, double vision, pain and discharge.   Respiratory:  Negative for cough and shortness of breath.    Cardiovascular:  Positive for chest pain. Negative for palpitations, orthopnea, claudication, leg swelling and PND.   Gastrointestinal:  Negative for abdominal pain, blood in stool, melena, nausea and vomiting.   Genitourinary:  Negative for dysuria and hematuria.   Musculoskeletal:  Negative for falls, joint pain and myalgias.   Skin:  Negative for itching and rash.   Neurological:  Negative for dizziness, sensory change, speech change, loss of consciousness and headaches.   Endo/Heme/Allergies:  Negative for environmental allergies. Does not bruise/bleed easily.   Psychiatric/Behavioral:  Negative for depression, hallucinations and suicidal ideas.               Objective     /68 (BP Location: Left arm, Patient Position: Sitting, BP Cuff Size: Adult)   Pulse 81   Resp 16   Ht 1.626 m (5' 4\")   Wt 64 kg (141 lb)   SpO2 96%   BMI 24.20 kg/m²     Physical Exam  Vitals and nursing note reviewed.   Constitutional:       General: She is not in acute distress.     Appearance: She is not diaphoretic.   HENT:      Head: Normocephalic and atraumatic.      Right Ear: External ear normal.      Left Ear: External ear normal.      Nose: No congestion or rhinorrhea.   Eyes:      General:         Right eye: No discharge.         Left eye: No discharge.   Neck:      Thyroid: No thyromegaly.      Vascular: No JVD.   Cardiovascular:      Rate and Rhythm: Normal rate and regular rhythm.      Pulses: Normal pulses.   Pulmonary:      Effort: No respiratory distress.   Abdominal:      General: There is no distension.      Tenderness: There is no abdominal tenderness.   Musculoskeletal:         General: No swelling or tenderness.      Right lower leg: No edema.      Left lower leg: No edema.   Skin:     General: Skin is warm and dry.   Neurological:      Mental Status: She is " alert and oriented to person, place, and time.      Cranial Nerves: No cranial nerve deficit.   Psychiatric:         Behavior: Behavior normal.                Assessment & Plan     1. Other chest pain  EC-ECHOCARDIOGRAM COMPLETE W/O CONT    EC-ECHOCARDIOGRAM REST/STRESS W/O CONT(DOES NOT INCLUDE A FULL RESTING ECHO)    APOLIPOPROTEIN B    Basic Metabolic Panel    HEMOGLOBIN A1C    LIPID PANEL      2. Elevated coronary artery calcium score        3. Family history of heart attack  EKG    rosuvastatin (CRESTOR) 20 MG Tab    ezetimibe (ZETIA) 10 MG Tab    EC-ECHOCARDIOGRAM COMPLETE W/O CONT    EC-ECHOCARDIOGRAM REST/STRESS W/O CONT(DOES NOT INCLUDE A FULL RESTING ECHO)    APOLIPOPROTEIN B    Basic Metabolic Panel    HEMOGLOBIN A1C    LIPID PANEL      4. Elevated lipoprotein A level  rosuvastatin (CRESTOR) 20 MG Tab    ezetimibe (ZETIA) 10 MG Tab    EC-ECHOCARDIOGRAM COMPLETE W/O CONT    EC-ECHOCARDIOGRAM REST/STRESS W/O CONT(DOES NOT INCLUDE A FULL RESTING ECHO)    APOLIPOPROTEIN B    Basic Metabolic Panel    HEMOGLOBIN A1C    LIPID PANEL      5. Family history of premature CAD  rosuvastatin (CRESTOR) 20 MG Tab    ezetimibe (ZETIA) 10 MG Tab    EC-ECHOCARDIOGRAM COMPLETE W/O CONT    EC-ECHOCARDIOGRAM REST/STRESS W/O CONT(DOES NOT INCLUDE A FULL RESTING ECHO)    APOLIPOPROTEIN B    Basic Metabolic Panel    HEMOGLOBIN A1C    LIPID PANEL      6. Pure hypercholesterolemia  rosuvastatin (CRESTOR) 20 MG Tab    ezetimibe (ZETIA) 10 MG Tab    EC-ECHOCARDIOGRAM COMPLETE W/O CONT    EC-ECHOCARDIOGRAM REST/STRESS W/O CONT(DOES NOT INCLUDE A FULL RESTING ECHO)    APOLIPOPROTEIN B    Basic Metabolic Panel    HEMOGLOBIN A1C    LIPID PANEL      7. Abnormal screening cardiac CT [R93.1]        8. Dyslipidemia [E78.5]        9. Elevated lipoprotein(a) [E78.41]            Medical Decision Making: Today's Assessment/Status/Plan:   At this time, to further risk stratify, I will order a transthoracic echocardiogram to assess cardiac and  valvular functions. I will also order an echocardiogram stress test (to avoid radiation exposure in young patients) to assess for coronary ischemia.    Will increase Rosuvastatin to 20 mg daily and add Zetia 10 mg daily for better LDL control (goal of less than 50).    This visit encounter signifies the visit complexity inherent to evaluation and management associated with medical care services that serve as the continuing focal point for all needed health care services and/or with medical care services that are part of ongoing care related to this patient's single, serious condition, complex cardiac condition.    Malou Martinez M.D.                            [1]   Past Medical History:  Diagnosis Date    GERD (gastroesophageal reflux disease)    [2]   Past Surgical History:  Procedure Laterality Date    LUMPECTOMY      biopsy benign   [3]   Allergies  Allergen Reactions    Ampicillin Hives    Peanut (Diagnostic) Diarrhea    Penicillins Hives and Rash     hives   [4]   Outpatient Encounter Medications as of 6/2/2025   Medication Sig Dispense Refill    Vitamin D-Vitamin K (VITAMIN K2-VITAMIN D3 PO) Take  by mouth every day.      rosuvastatin (CRESTOR) 20 MG Tab Take 1 Tablet by mouth every evening. 100 Tablet 4    ezetimibe (ZETIA) 10 MG Tab Take 1 Tablet by mouth every day. 100 Tablet 4    [DISCONTINUED] rosuvastatin (CRESTOR) 10 MG Tab Take 1 Tablet by mouth every evening. 90 Tablet 3     No facility-administered encounter medications on file as of 6/2/2025.

## 2025-06-20 ENCOUNTER — HOSPITAL ENCOUNTER (OUTPATIENT)
Dept: RADIOLOGY | Facility: MEDICAL CENTER | Age: 60
End: 2025-06-20
Payer: COMMERCIAL

## 2025-06-20 DIAGNOSIS — Z12.31 ENCOUNTER FOR SCREENING MAMMOGRAM FOR MALIGNANT NEOPLASM OF BREAST: ICD-10-CM

## 2025-06-20 PROCEDURE — 77063 BREAST TOMOSYNTHESIS BI: CPT

## 2025-07-14 ENCOUNTER — PATIENT MESSAGE (OUTPATIENT)
Dept: CARDIOLOGY | Facility: MEDICAL CENTER | Age: 60
End: 2025-07-14
Payer: COMMERCIAL

## 2025-07-14 NOTE — PATIENT COMMUNICATION
Chart reviewed, pt last see by TT 06/02/25 (new pt appt) for other chest pain. TT ordered stress-echo to r/o ischemia and ECHO, pt currently scheduled for 09/18/25 and 10/08/25 respectively. MyChart response sent to pt including ER precautions and to see if she can schedule tests earlier for sooner evaluation.

## 2025-07-15 ENCOUNTER — TELEPHONE (OUTPATIENT)
Dept: CARDIOLOGY | Facility: MEDICAL CENTER | Age: 60
End: 2025-07-15
Payer: COMMERCIAL

## 2025-07-15 NOTE — TELEPHONE ENCOUNTER
Keira DEY to Me (Selected Message)   7/15/25 11:15 AM  I s/w the patient updated her on her Echo not needing authorization and let her know that the Stress test will not assign to someone to request authorization until 30 days prior to her appointment.  She understands and appreciated the call.    Me to Keira DEY  7/15/25 10:51 AM  Padilla Crockett, I was hoping you can send this to the appropriate imaging auth team member for me. Pt's echocardiogram is scheduled for 07/21/25. Thank you!  ---------------------------------------------------------------------------------  Noted above per Keira with imaging auth.

## 2025-07-15 NOTE — TELEPHONE ENCOUNTER
TT          Caller: SYVEL WITH [employer]    Topic/issue: Their office was calling on the patient's behalf regarding if the appts for the ECHO and ECHO stress test required any kind of prior authorization for the appts and they needed the procedure codes for the appts as well and they asked for a call back and they asked we reach out to the patient when is all resolved. Please advise        Callback Number: see below for patricia  Pt # [phone]        Thank you    -Sidney FIGUEROA

## 2025-07-21 ENCOUNTER — HOSPITAL ENCOUNTER (OUTPATIENT)
Dept: CARDIOLOGY | Facility: MEDICAL CENTER | Age: 60
End: 2025-07-21
Attending: INTERNAL MEDICINE
Payer: COMMERCIAL

## 2025-07-21 ENCOUNTER — RESULTS FOLLOW-UP (OUTPATIENT)
Dept: CARDIOLOGY | Facility: MEDICAL CENTER | Age: 60
End: 2025-07-21

## 2025-07-21 DIAGNOSIS — E78.00 PURE HYPERCHOLESTEROLEMIA: ICD-10-CM

## 2025-07-21 DIAGNOSIS — R07.89 OTHER CHEST PAIN: ICD-10-CM

## 2025-07-21 DIAGNOSIS — E78.41 ELEVATED LIPOPROTEIN A LEVEL: ICD-10-CM

## 2025-07-21 DIAGNOSIS — Z82.49 FAMILY HISTORY OF HEART ATTACK: ICD-10-CM

## 2025-07-21 DIAGNOSIS — Z82.49 FAMILY HISTORY OF PREMATURE CAD: ICD-10-CM

## 2025-07-21 LAB
LV EJECT FRACT  99904: 60
LV EJECT FRACT MOD 2C 99903: 62.5
LV EJECT FRACT MOD 4C 99902: 61.1
LV EJECT FRACT MOD BP 99901: 61.55

## 2025-07-21 PROCEDURE — 93306 TTE W/DOPPLER COMPLETE: CPT | Mod: 26 | Performed by: INTERNAL MEDICINE

## 2025-07-21 PROCEDURE — 93306 TTE W/DOPPLER COMPLETE: CPT

## 2025-07-25 ENCOUNTER — APPOINTMENT (OUTPATIENT)
Dept: LAB | Facility: MEDICAL CENTER | Age: 60
End: 2025-07-25
Payer: COMMERCIAL

## 2025-07-25 DIAGNOSIS — E78.41 ELEVATED LIPOPROTEIN A LEVEL: ICD-10-CM

## 2025-07-25 DIAGNOSIS — Z82.49 FAMILY HISTORY OF PREMATURE CAD: ICD-10-CM

## 2025-07-25 DIAGNOSIS — Z82.49 FAMILY HISTORY OF HEART ATTACK: ICD-10-CM

## 2025-07-25 DIAGNOSIS — E78.00 PURE HYPERCHOLESTEROLEMIA: ICD-10-CM

## 2025-07-25 DIAGNOSIS — R07.89 OTHER CHEST PAIN: ICD-10-CM

## 2025-07-25 LAB
ANION GAP SERPL CALC-SCNC: 13 MMOL/L (ref 7–16)
BUN SERPL-MCNC: 4 MG/DL (ref 8–22)
CALCIUM SERPL-MCNC: 9.5 MG/DL (ref 8.5–10.5)
CHLORIDE SERPL-SCNC: 104 MMOL/L (ref 96–112)
CHOLEST SERPL-MCNC: 116 MG/DL (ref 100–199)
CO2 SERPL-SCNC: 21 MMOL/L (ref 20–33)
CREAT SERPL-MCNC: 0.78 MG/DL (ref 0.5–1.4)
EST. AVERAGE GLUCOSE BLD GHB EST-MCNC: 105 MG/DL
GFR SERPLBLD CREATININE-BSD FMLA CKD-EPI: 87 ML/MIN/1.73 M 2
GLUCOSE SERPL-MCNC: 87 MG/DL (ref 65–99)
HBA1C MFR BLD: 5.3 % (ref 4–5.6)
HDLC SERPL-MCNC: 54 MG/DL
LDLC SERPL CALC-MCNC: 50 MG/DL
POTASSIUM SERPL-SCNC: 4.1 MMOL/L (ref 3.6–5.5)
SODIUM SERPL-SCNC: 138 MMOL/L (ref 135–145)
TRIGL SERPL-MCNC: 61 MG/DL (ref 0–149)

## 2025-07-25 PROCEDURE — 80048 BASIC METABOLIC PNL TOTAL CA: CPT

## 2025-07-25 PROCEDURE — 80061 LIPID PANEL: CPT

## 2025-07-25 PROCEDURE — 83036 HEMOGLOBIN GLYCOSYLATED A1C: CPT

## 2025-07-25 PROCEDURE — 82172 ASSAY OF APOLIPOPROTEIN: CPT

## 2025-07-25 PROCEDURE — 36415 COLL VENOUS BLD VENIPUNCTURE: CPT

## 2025-07-27 LAB — APO B100 SERPL-MCNC: 66 MG/DL (ref 60–117)

## 2025-08-14 ENCOUNTER — HOSPITAL ENCOUNTER (OUTPATIENT)
Dept: CARDIOLOGY | Facility: MEDICAL CENTER | Age: 60
End: 2025-08-14
Attending: INTERNAL MEDICINE
Payer: COMMERCIAL

## 2025-08-14 DIAGNOSIS — E78.00 PURE HYPERCHOLESTEROLEMIA: ICD-10-CM

## 2025-08-14 DIAGNOSIS — Z82.49 FAMILY HISTORY OF HEART ATTACK: ICD-10-CM

## 2025-08-14 DIAGNOSIS — E78.41 ELEVATED LIPOPROTEIN A LEVEL: ICD-10-CM

## 2025-08-14 DIAGNOSIS — Z82.49 FAMILY HISTORY OF PREMATURE CAD: ICD-10-CM

## 2025-08-14 DIAGNOSIS — R07.89 OTHER CHEST PAIN: ICD-10-CM

## 2025-08-14 PROCEDURE — 93017 CV STRESS TEST TRACING ONLY: CPT | Mod: TC

## 2025-08-15 LAB — LV EJECT FRACT  99904: 60

## 2025-08-15 PROCEDURE — 93351 STRESS TTE COMPLETE: CPT | Mod: 26 | Performed by: INTERNAL MEDICINE
